# Patient Record
Sex: FEMALE | Race: BLACK OR AFRICAN AMERICAN | Employment: FULL TIME | URBAN - METROPOLITAN AREA
[De-identification: names, ages, dates, MRNs, and addresses within clinical notes are randomized per-mention and may not be internally consistent; named-entity substitution may affect disease eponyms.]

---

## 2017-10-06 ENCOUNTER — HOSPITAL ENCOUNTER (OUTPATIENT)
Dept: MAMMOGRAPHY | Age: 57
Discharge: HOME | End: 2017-10-06
Attending: FAMILY MEDICINE
Payer: COMMERCIAL

## 2017-10-06 DIAGNOSIS — Z12.31 VISIT FOR SCREENING MAMMOGRAM: ICD-10-CM

## 2017-10-06 PROCEDURE — 77063 BREAST TOMOSYNTHESIS BI: CPT

## 2018-04-20 ENCOUNTER — HOSPITAL ENCOUNTER (OUTPATIENT)
Dept: CT IMAGING | Age: 58
Discharge: HOME | End: 2018-04-20
Attending: OBSTETRICS & GYNECOLOGY
Payer: COMMERCIAL

## 2018-04-20 DIAGNOSIS — R31.21 ASYMPTOMATIC MICROSCOPIC HEMATURIA: ICD-10-CM

## 2018-04-20 PROCEDURE — 74011000258 HC RX REV CODE- 258: Performed by: OBSTETRICS & GYNECOLOGY

## 2018-04-20 PROCEDURE — 74011636320 HC RX REV CODE- 636/320: Performed by: OBSTETRICS & GYNECOLOGY

## 2018-04-20 PROCEDURE — 74178 CT ABD&PLV WO CNTR FLWD CNTR: CPT

## 2018-04-20 RX ORDER — SODIUM CHLORIDE 0.9 % (FLUSH) 0.9 %
10 SYRINGE (ML) INJECTION
Status: COMPLETED | OUTPATIENT
Start: 2018-04-20 | End: 2018-04-20

## 2018-04-20 RX ADMIN — SODIUM CHLORIDE 100 ML: 900 INJECTION, SOLUTION INTRAVENOUS at 18:43

## 2018-04-20 RX ADMIN — Medication 10 ML: at 18:43

## 2018-04-20 RX ADMIN — IOPAMIDOL 100 ML: 612 INJECTION, SOLUTION INTRAVENOUS at 18:43

## 2018-11-19 ENCOUNTER — HOSPITAL ENCOUNTER (OUTPATIENT)
Dept: MAMMOGRAPHY | Age: 58
Discharge: HOME | End: 2018-11-19
Attending: FAMILY MEDICINE
Payer: COMMERCIAL

## 2018-11-19 DIAGNOSIS — Z12.39 SCREENING BREAST EXAMINATION: ICD-10-CM

## 2018-11-19 DIAGNOSIS — Z13.820 SCREENING FOR OSTEOPOROSIS: ICD-10-CM

## 2018-11-19 PROCEDURE — 77063 BREAST TOMOSYNTHESIS BI: CPT

## 2018-11-19 PROCEDURE — 77080 DXA BONE DENSITY AXIAL: CPT

## 2019-11-21 ENCOUNTER — HOSPITAL ENCOUNTER (OUTPATIENT)
Dept: NUCLEAR MEDICINE | Age: 59
End: 2019-11-21
Attending: UROLOGY
Payer: COMMERCIAL

## 2019-11-21 ENCOUNTER — HOSPITAL ENCOUNTER (OUTPATIENT)
Dept: MAMMOGRAPHY | Age: 59
Discharge: HOME | End: 2019-11-21
Attending: FAMILY MEDICINE
Payer: COMMERCIAL

## 2019-11-21 DIAGNOSIS — Z12.31 VISIT FOR SCREENING MAMMOGRAM: ICD-10-CM

## 2019-11-21 PROCEDURE — 77063 BREAST TOMOSYNTHESIS BI: CPT

## 2020-07-08 ENCOUNTER — OFFICE VISIT (OUTPATIENT)
Dept: GYNECOLOGY | Age: 60
End: 2020-07-08

## 2020-07-08 VITALS
WEIGHT: 141.2 LBS | HEIGHT: 69 IN | SYSTOLIC BLOOD PRESSURE: 110 MMHG | BODY MASS INDEX: 20.91 KG/M2 | DIASTOLIC BLOOD PRESSURE: 69 MMHG | HEART RATE: 70 BPM

## 2020-07-08 DIAGNOSIS — D25.0 INTRAMURAL, SUBMUCOUS, AND SUBSEROUS LEIOMYOMA OF UTERUS: Primary | ICD-10-CM

## 2020-07-08 DIAGNOSIS — N85.8 UTERINE MASS: ICD-10-CM

## 2020-07-08 DIAGNOSIS — D25.2 INTRAMURAL, SUBMUCOUS, AND SUBSEROUS LEIOMYOMA OF UTERUS: Primary | ICD-10-CM

## 2020-07-08 DIAGNOSIS — D25.1 INTRAMURAL, SUBMUCOUS, AND SUBSEROUS LEIOMYOMA OF UTERUS: Primary | ICD-10-CM

## 2020-07-08 RX ORDER — CHOLECALCIFEROL (VITAMIN D3) 125 MCG
1 CAPSULE ORAL DAILY
COMMUNITY

## 2020-07-08 NOTE — PROGRESS NOTES
[address], [address], [address] [place] [address]  P [phone]  F [phone]    Office Note  Patient ID:  Name:  Albino Johns  MRN:  [id number]  :  1960/60 y.o. Date:  2020      HISTORY OF PRESENT ILLNESS:  Albino Johns is a 62 y.o.  postmenopausal female who is being seen for enlarging uterine fibroids in menopause. She is referred by Dr. Dheeraj Dumont. She has a long history of uterine fibroids. She recently underwent a pelvic ultrasound at [facility]. This revealed numerous enlarging fibroids, although not significantly larger than previously. There were at least 8 measurable fibroids. The right ovary appeared normal.  The left ovary was not seen. There was no free fluid. Due to the concerning fact of postmenopausal enlargement of fibroids, Dr. Jessica Castro has asked that I see her in consultation for surgery. ROS:   and GI review:  Negative  Cardiopulmonary review:  Negative   Musculoskeletal:  Negative    A comprehensive review of systems was negative except for that written in the History of Present Illness. , 10 point ROS      OB/GYN ROS:    Patient denies any abnormal bleeding or vaginal discharge. Problem List:  There are no active problems to display for this patient. PMH:  No past medical history on file. PSH:  No past surgical history on file. Social History:  Social History     Tobacco Use    Smoking status: Not on file   Substance Use Topics    Alcohol use: Not on file      Family History:  No family history on file. Medications: (reviewed)  No current outpatient medications on file. No current facility-administered medications for this visit. Allergies: (reviewed)  Allergies not on file       OBJECTIVE:    Physical Exam:  VITAL SIGNS: There were no vitals filed for this visit. There is no height or weight on file to calculate BMI. GENERAL AXEL: Conversant, alert, oriented.  No acute distress. HEENT: HEENT. No thyroid enlargement. No JVD. Neck: Supple without restrictions. RESPIRATORY: Clear to auscultation and percussion to the bases. No CVAT. CARDIOVASC: RRR without murmur/rub. GASTROINT: soft, non-tender, without masses or organomegaly   MUSCULOSKEL: no joint tenderness, deformity or swelling   EXTREMITIES: extremities normal, atraumatic, no cyanosis or edema   PELVIC: Vulva and vagina appear normal. Normal cervix. Bimanual exam reveals a mildly enlarged fibroid uterus, which was fairly mobile. The adnexa were not palpable. RECTAL: Deferred   LEXIE SURVEY: No suspicious lymphadenopathy or edema noted. NEURO: Grossly intact. No acute deficit. Lab Data:    No results found for: WBC, HGB, HCT, PLT, MCV, HGBEXT, HCTEXT, PLTEXT  No results found for: NA, K, CL, CO2, AGAP, GLU, BUN, CREA, BUCR, GFRAA, GFRNA, CA      Imaging: Outside pelvic ultrasound from [facility] reviewed. Pertinent findings noted in HPI. IMPRESSION/PLAN:  Isidoro Ron is a 62 y.o. female with a working diagnosis of enlarging uterine fibroids in menopause. I reviewed with Isidoro Ron her medical records, physical exam, and review of symptoms. I explained to her Dr. Ismael Silveira and my concern of the enlarging fibroids. Typically fibroids are going to shrink in menopause. Postmenopausal enlargement raises the possibility of uterine sarcoma. I recommended that we proceed with a laparoscopic hysterectomy with bilateral salpingooophorectomy. We will send the uterus for frozen section pathology to determine the need for additional staging or evaluation. She was counseled on the risks, benefits, indications, and alternatives of surgery. Her questions were answered and she wishes to proceed as planned. The patient was counseled at length about the risks of dana Covid-19 during their perioperative period and any recovery window from their procedure.   The patient was made aware that dana Covid-19  may worsen their prognosis for recovering from their procedure and lend to a higher morbidity and/or mortality risk. All material risks, benefits, and reasonable alternatives including postponing the procedure were discussed. The patient does wish to proceed with the procedure at this time.       Signed By: Irma Mejía MD     7/8/2020/9:00 AM

## 2020-07-08 NOTE — LETTER
2020 4:24 PM 
 
Patient:  Chip Caro YOB: 1960 Date of Visit: 2020 Dear Megan Mathis MD 
[id number] [phone] [id number] VIA In Basket: Thank you for referring Ms. Chip Caro to me for evaluation/treatment. Below are the relevant portions of my assessment and plan of care. [facility], [address] 
P [phone]  F [phone] Office Note Patient ID: 
Name:  Chip Caro MRN:  [id number] :  1960/60 y.o. Date:  2020 HISTORY OF PRESENT ILLNESS: 
Chip Caro is a 62 y.o.  postmenopausal female who is being seen for enlarging uterine fibroids in menopause. She is referred by Dr. Blessing Whitney. She has a long history of uterine fibroids. She recently underwent a pelvic ultrasound at [facility]. This revealed numerous enlarging fibroids, although not significantly larger than previously. There were at least 8 measurable fibroids. The right ovary appeared normal.  The left ovary was not seen. There was no free fluid. Due to the concerning fact of postmenopausal enlargement of fibroids, Dr. Nimisha Clarke has asked that I see her in consultation for surgery. ROS: 
 and GI review:  Negative Cardiopulmonary review:  Negative Musculoskeletal:  Negative A comprehensive review of systems was negative except for that written in the History of Present Illness. , 10 point ROS 
 
 
OB/GYN ROS: 
 Patient denies any abnormal bleeding or vaginal discharge. Problem List: 
There are no active problems to display for this patient. PMH: 
No past medical history on file. PSH: 
No past surgical history on file. Social History: 
Social History Tobacco Use  Smoking status: Not on file Substance Use Topics  Alcohol use: Not on file Family History: No family history on file. Medications: (reviewed) No current outpatient medications on file. No current facility-administered medications for this visit. Allergies: (reviewed) Allergies not on file OBJECTIVE: 
 
Physical Exam: VITAL SIGNS: There were no vitals filed for this visit. There is no height or weight on file to calculate BMI. GENERAL AXEL: Conversant, alert, oriented. No acute distress. HEENT: HEENT. No thyroid enlargement. No JVD. Neck: Supple without restrictions. RESPIRATORY: Clear to auscultation and percussion to the bases. No CVAT. CARDIOVASC: RRR without murmur/rub. GASTROINT: soft, non-tender, without masses or organomegaly MUSCULOSKEL: no joint tenderness, deformity or swelling EXTREMITIES: extremities normal, atraumatic, no cyanosis or edema PELVIC: Vulva and vagina appear normal. Normal cervix. Bimanual exam reveals a mildly enlarged fibroid uterus, which was fairly mobile. The adnexa were not palpable. RECTAL: Deferred LEXIE SURVEY: No suspicious lymphadenopathy or edema noted. NEURO: Grossly intact. No acute deficit. Lab Data: 
 
No results found for: WBC, HGB, HCT, PLT, MCV, HGBEXT, HCTEXT, PLTEXT No results found for: NA, K, CL, CO2, AGAP, GLU, BUN, CREA, BUCR, GFRAA, GFRNA, CA Imaging: Outside pelvic ultrasound from [facility] reviewed. Pertinent findings noted in HPI. IMPRESSION/PLAN: 
Sary Rivas is a 62 y.o. female with a working diagnosis of enlarging uterine fibroids in menopause. I reviewed with Sary Rivas her medical records, physical exam, and review of symptoms. I explained to her Dr. Bryan Viera and my concern of the enlarging fibroids. Typically fibroids are going to shrink in menopause. Postmenopausal enlargement raises the possibility of uterine sarcoma. I recommended that we proceed with a laparoscopic hysterectomy with bilateral salpingooophorectomy.   We will send the uterus for frozen section pathology to determine the need for additional staging or evaluation. She was counseled on the risks, benefits, indications, and alternatives of surgery. Her questions were answered and she wishes to proceed as planned. The patient was counseled at length about the risks of dana Covid-19 during their perioperative period and any recovery window from their procedure. The patient was made aware that dana Covid-19  may worsen their prognosis for recovering from their procedure and lend to a higher morbidity and/or mortality risk. All material risks, benefits, and reasonable alternatives including postponing the procedure were discussed. The patient  does wish to proceed with the procedure at this time. Signed By: Elo Busch MD   
 7/8/2020/9:00 AM  
 
 
New patient referred by [facility] for fibroids. Patient c/o minor discomfort in her lower abdomen. If you have questions, please do not hesitate to call me. I look forward to following MsTala Roberts along with you.  
 
 
 
Sincerely, 
 
 
Elo Busch MD

## 2020-07-08 NOTE — PROGRESS NOTES
New patient referred by [facility] for fibroids. Patient c/o minor discomfort in her lower abdomen.

## 2020-07-08 NOTE — PATIENT INSTRUCTIONS
[employer] Activation Thank you for requesting access to [employer]. Please follow the instructions below to securely access and download your online medical record. [employer] allows you to send messages to your doctor, view your test results, renew your prescriptions, schedule appointments, and more. How Do I Sign Up? 1. In your internet browser, go to [email or web address]. 2. Click on the First Time User? Click Here link in the Sign In box. You will see the New Member Sign Up page. 3. Enter your [employer] Access Code exactly as it appears below. You will not need to use this code after youve completed the sign-up process. If you do not sign up before the expiration date, you must request a new code. [employer] Access Code: [email or web address] Expires: 2020  2:53 PM (This is the date your [employer] access code will ) 4. Enter the last four digits of your Social Security Number (xxxx) and Date of Birth (mm/dd/yyyy) as indicated and click Submit. You will be taken to the next sign-up page. 5. Create a [employer] ID. This will be your [employer] login ID and cannot be changed, so think of one that is secure and easy to remember. 6. Create a [employer] password. You can change your password at any time. 7. Enter your Password Reset Question and Answer. This can be used at a later time if you forget your password. 8. Enter your e-mail address. You will receive e-mail notification when new information is available in [address] 9. Click Sign Up. You can now view and download portions of your medical record. 10. Click the Download Summary menu link to download a portable copy of your medical information. Additional Information If you have questions, please visit the Frequently Asked Questions section of the [employer] website at [email or web address] Remember, [employer] is NOT to be used for urgent needs. For medical emergencies, dial 911.

## 2020-07-28 ENCOUNTER — HOSPITAL ENCOUNTER (OUTPATIENT)
Dept: PREADMISSION TESTING | Age: 60
Discharge: HOME | End: 2020-07-28
Payer: COMMERCIAL

## 2020-07-28 VITALS
WEIGHT: 139.99 LBS | SYSTOLIC BLOOD PRESSURE: 112 MMHG | DIASTOLIC BLOOD PRESSURE: 70 MMHG | HEIGHT: 70 IN | BODY MASS INDEX: 20.04 KG/M2

## 2020-07-28 LAB
ALBUMIN SERPL-MCNC: 3.7 G/DL (ref 3.5–5)
ALBUMIN/GLOB SERPL: 1.1 {RATIO} (ref 1.1–2.2)
ALP SERPL-CCNC: 92 U/L (ref 45–117)
ALT SERPL-CCNC: 18 U/L (ref 12–78)
ANION GAP SERPL CALC-SCNC: 5 MMOL/L (ref 5–15)
AST SERPL-CCNC: 13 U/L (ref 15–37)
BASOPHILS # BLD: 0 K/UL (ref 0–0.1)
BASOPHILS NFR BLD: 1 % (ref 0–1)
BILIRUB SERPL-MCNC: 0.5 MG/DL (ref 0.2–1)
BUN SERPL-MCNC: 17 MG/DL (ref 6–20)
BUN/CREAT SERPL: 23 (ref 12–20)
CALCIUM SERPL-MCNC: 9 MG/DL (ref 8.5–10.1)
CHLORIDE SERPL-SCNC: 108 MMOL/L (ref 97–108)
CO2 SERPL-SCNC: 29 MMOL/L (ref 21–32)
CREAT SERPL-MCNC: 0.73 MG/DL (ref 0.55–1.02)
DIFFERENTIAL METHOD BLD: ABNORMAL
EOSINOPHIL # BLD: 0.1 K/UL (ref 0–0.4)
EOSINOPHIL NFR BLD: 3 % (ref 0–7)
ERYTHROCYTE [DISTWIDTH] IN BLOOD BY AUTOMATED COUNT: 12.3 % (ref 11.5–14.5)
GLOBULIN SER CALC-MCNC: 3.4 G/DL (ref 2–4)
GLUCOSE SERPL-MCNC: 80 MG/DL (ref 65–100)
HCT VFR BLD AUTO: 37.3 % (ref 35–47)
HGB BLD-MCNC: 11.7 G/DL (ref 11.5–16)
IMM GRANULOCYTES # BLD AUTO: 0 K/UL (ref 0–0.04)
IMM GRANULOCYTES NFR BLD AUTO: 0 % (ref 0–0.5)
LYMPHOCYTES # BLD: 0.8 K/UL (ref 0.8–3.5)
LYMPHOCYTES NFR BLD: 26 % (ref 12–49)
MCH RBC QN AUTO: 29.5 PG (ref 26–34)
MCHC RBC AUTO-ENTMCNC: 31.4 G/DL (ref 30–36.5)
MCV RBC AUTO: 94 FL (ref 80–99)
MONOCYTES # BLD: 0.3 K/UL (ref 0–1)
MONOCYTES NFR BLD: 11 % (ref 5–13)
NEUTS SEG # BLD: 1.9 K/UL (ref 1.8–8)
NEUTS SEG NFR BLD: 59 % (ref 32–75)
NRBC # BLD: 0 K/UL (ref 0–0.01)
NRBC BLD-RTO: 0 PER 100 WBC
PLATELET # BLD AUTO: 195 K/UL (ref 150–400)
PMV BLD AUTO: 10.9 FL (ref 8.9–12.9)
POTASSIUM SERPL-SCNC: 4 MMOL/L (ref 3.5–5.1)
PROT SERPL-MCNC: 7.1 G/DL (ref 6.4–8.2)
RBC # BLD AUTO: 3.97 M/UL (ref 3.8–5.2)
RBC MORPH BLD: ABNORMAL
SODIUM SERPL-SCNC: 142 MMOL/L (ref 136–145)
WBC # BLD AUTO: 3.1 K/UL (ref 3.6–11)

## 2020-07-28 PROCEDURE — 85025 COMPLETE CBC W/AUTO DIFF WBC: CPT

## 2020-07-28 PROCEDURE — 80053 COMPREHEN METABOLIC PANEL: CPT

## 2020-07-28 PROCEDURE — 36415 COLL VENOUS BLD VENIPUNCTURE: CPT

## 2020-07-28 NOTE — PERIOP NOTES
PATIENT GIVEN SURGICAL SITE INFECTION FAQ HANDOUT AND HAND WASHING TIP SHEET. PREOP INSTRUCTIONS REVIEWED AND PATIENT VERBALIZES UNDERSTANDING OF INSTRUCTIONS. PATIENT HAS BEEN GIVEN THE OPPORTUNITY TO ASK ADDITIONAL QUESTIONS. GAVE PATIENT 2 BOTTLES OF CHG CLEANSER AND INSTRUCTIONS, PATIENT VERBALIZED UNDERSTANDING. PATIENT CALLED AND MADE AWARE OF COVID-19 TESTING NEEDED TO BE DONE WITHIN 96 HOURS OF SURGERY. COVID-19 TESTING APPOINTMENT TO BE  MADE FOR PATIENT. PATIENT INSTRUCTED ON SELF QUARANTINE BETWEEN TESTING AND ARRIVAL TIME DAY OF SURGERY.

## 2020-08-04 ENCOUNTER — TELEPHONE (OUTPATIENT)
Dept: GYNECOLOGY | Age: 60
End: 2020-08-04

## 2020-08-08 ENCOUNTER — HOSPITAL ENCOUNTER (OUTPATIENT)
Dept: PREADMISSION TESTING | Age: 60
Discharge: HOME | End: 2020-08-08
Payer: COMMERCIAL

## 2020-08-08 DIAGNOSIS — Z20.822 ENCOUNTER FOR LABORATORY TESTING FOR COVID-19 VIRUS: ICD-10-CM

## 2020-08-08 PROCEDURE — 87635 SARS-COV-2 COVID-19 AMP PRB: CPT

## 2020-08-09 LAB — SARS-COV-2, COV2NT: NOT DETECTED

## 2020-08-11 NOTE — H&P
[facility] [address], [address] [facility]  P [phone]  F [phone]    Office Note  Patient ID:  Name:  Katerina Mccoy  MRN:  [id number]  :  1960/60 y.o. Date:  2020      HISTORY OF PRESENT ILLNESS:  Katerina Mccoy is a 62 y.o.  postmenopausal female who is being seen for enlarging uterine fibroids in menopause. She is referred by Dr. Adarsh Mejia. She has a long history of uterine fibroids. She recently underwent a pelvic ultrasound at [facility]. This revealed numerous enlarging fibroids, although not significantly larger than previously. There were at least 8 measurable fibroids. The right ovary appeared normal.  The left ovary was not seen. There was no free fluid. Due to the concerning fact of postmenopausal enlargement of fibroids, Dr. Jeffery Trinidad has asked that I see her in consultation for surgery. ROS:   and GI review:  Negative  Cardiopulmonary review:  Negative   Musculoskeletal:  Negative    A comprehensive review of systems was negative except for that written in the History of Present Illness. , 10 point ROS      OB/GYN ROS:    Patient denies any abnormal bleeding or vaginal discharge. Problem List:  There are no active problems to display for this patient. PMH:  Past Medical History:   Diagnosis Date    Colon polyps     Fibrocystic breast     Fibroids     Kidney stones     Motion sickness       PSH:  Past Surgical History:   Procedure Laterality Date    HX BREAST BIOPSY Left     years ago, benign, no scar    HX BREAST BIOPSY      Fragments of benign breast tissue with focal scant microcalcifications.  Focal reactive lymphohistiocytic infiltrate    HX CERVICAL POLYPECTOMY      HX COLONOSCOPY      benign polyp    HX HYSTEROSCOPY      resection of endometrial polyp    HX OTHER SURGICAL  , 2008    Endometrial biopsy- polyp noted    HX UROLOGICAL      scope removal of stone     SHANE BIOPSY BREAST STEREOTACTIC Left 02/2014    benign      Social History:  Social History     Tobacco Use    Smoking status: Never Smoker    Smokeless tobacco: Never Used   Substance Use Topics    Alcohol use: Never     Frequency: Never      Family History:  Family History   Problem Relation Age of Onset    Lung Cancer Father     Breast Cancer Maternal Aunt         60's    Anesth Problems Neg Hx       Medications: (reviewed)  No current facility-administered medications for this encounter. Current Outpatient Medications   Medication Sig    cholecalciferol, vitamin D3, (Vitamin D3) 50 mcg (2,000 unit) tab Take 1 Tab by mouth daily. Allergies: (reviewed)  Allergies   Allergen Reactions    Adhesive Tape-Silicones Rash    Pcn [Penicillins] Nausea Only          OBJECTIVE:    Physical Exam:  VITAL SIGNS: There were no vitals filed for this visit. There is no height or weight on file to calculate BMI. GENERAL AXEL: Conversant, alert, oriented. No acute distress. HEENT: HEENT. No thyroid enlargement. No JVD. Neck: Supple without restrictions. RESPIRATORY: Clear to auscultation and percussion to the bases. No CVAT. CARDIOVASC: RRR without murmur/rub. GASTROINT: soft, non-tender, without masses or organomegaly   MUSCULOSKEL: no joint tenderness, deformity or swelling   EXTREMITIES: extremities normal, atraumatic, no cyanosis or edema   PELVIC: Vulva and vagina appear normal. Normal cervix. Bimanual exam reveals a mildly enlarged fibroid uterus, which was fairly mobile. The adnexa were not palpable. RECTAL: Deferred   LEXIE SURVEY: No suspicious lymphadenopathy or edema noted. NEURO: Grossly intact. No acute deficit.        Lab Data:    Lab Results   Component Value Date/Time    WBC 3.1 (L) 07/28/2020 08:34 AM    HGB 11.7 07/28/2020 08:34 AM    HCT 37.3 07/28/2020 08:34 AM    PLATELET 936 99/83/1768 08:34 AM    MCV 94.0 07/28/2020 08:34 AM     Lab Results   Component Value Date/Time    Sodium 142 07/28/2020 08:34 AM    Potassium 4.0 07/28/2020 08:34 AM    Chloride 108 07/28/2020 08:34 AM    CO2 29 07/28/2020 08:34 AM    Anion gap 5 07/28/2020 08:34 AM    Glucose 80 07/28/2020 08:34 AM    BUN 17 07/28/2020 08:34 AM    Creatinine 0.73 07/28/2020 08:34 AM    BUN/Creatinine ratio 23 (H) 07/28/2020 08:34 AM    GFR est AA >60 07/28/2020 08:34 AM    GFR est non-AA >60 07/28/2020 08:34 AM    Calcium 9.0 07/28/2020 08:34 AM         Imaging: Outside pelvic ultrasound from [facility] reviewed. Pertinent findings noted in HPI. IMPRESSION/PLAN:  Jose L Ann is a 62 y.o. female with a working diagnosis of enlarging uterine fibroids in menopause. I reviewed with Jose L Ann her medical records, physical exam, and review of symptoms. I explained to her Dr. Wolf Jack and my concern of the enlarging fibroids. Typically fibroids are going to shrink in menopause. Postmenopausal enlargement raises the possibility of uterine sarcoma. I recommended that we proceed with a laparoscopic hysterectomy with bilateral salpingooophorectomy. We will send the uterus for frozen section pathology to determine the need for additional staging or evaluation. She was counseled on the risks, benefits, indications, and alternatives of surgery. Her questions were answered and she wishes to proceed as planned. The patient was counseled at length about the risks of dana Covid-19 during their perioperative period and any recovery window from their procedure. The patient was made aware that dana Covid-19  may worsen their prognosis for recovering from their procedure and lend to a higher morbidity and/or mortality risk. All material risks, benefits, and reasonable alternatives including postponing the procedure were discussed. The patient does wish to proceed with the procedure at this time.       Signed By: Ella Gresham MD     8/11/2020/9:00 AM       Date of Surgery Update:  Melissa Vazquez was seen and examined. History and physical has been reviewed. The patient has been examined. There have been no significant clinical changes since the completion of the originally dated History and Physical.  Patient identified by surgeon; surgical site was confirmed by patient and surgeon.     Signed By: Irma Mejía MD     August 12, 2020 7:35 AM

## 2020-08-12 ENCOUNTER — HOSPITAL ENCOUNTER (OUTPATIENT)
Age: 60
Discharge: HOME | End: 2020-08-12
Attending: OBSTETRICS & GYNECOLOGY | Admitting: OBSTETRICS & GYNECOLOGY
Payer: COMMERCIAL

## 2020-08-12 ENCOUNTER — ANESTHESIA (OUTPATIENT)
Dept: SURGERY | Age: 60
End: 2020-08-12
Payer: COMMERCIAL

## 2020-08-12 ENCOUNTER — ANESTHESIA EVENT (OUTPATIENT)
Dept: SURGERY | Age: 60
End: 2020-08-12
Payer: COMMERCIAL

## 2020-08-12 VITALS
HEART RATE: 89 BPM | RESPIRATION RATE: 16 BRPM | BODY MASS INDEX: 19.92 KG/M2 | WEIGHT: 139.15 LBS | DIASTOLIC BLOOD PRESSURE: 73 MMHG | TEMPERATURE: 99.1 F | HEIGHT: 70 IN | SYSTOLIC BLOOD PRESSURE: 127 MMHG | OXYGEN SATURATION: 98 %

## 2020-08-12 DIAGNOSIS — Z90.710 S/P HYSTERECTOMY: Primary | ICD-10-CM

## 2020-08-12 DIAGNOSIS — D25.1 INTRAMURAL AND SUBSEROUS LEIOMYOMA OF UTERUS: ICD-10-CM

## 2020-08-12 DIAGNOSIS — D25.2 INTRAMURAL AND SUBSEROUS LEIOMYOMA OF UTERUS: ICD-10-CM

## 2020-08-12 PROBLEM — D25.9 LEIOMYOMA OF UTERUS: Status: ACTIVE | Noted: 2020-08-12

## 2020-08-12 PROCEDURE — 77030010507 HC ADH SKN DERMBND J&J -B: Performed by: OBSTETRICS & GYNECOLOGY

## 2020-08-12 PROCEDURE — 77030002882 HC SUT CART KNOT LSIS -B: Performed by: OBSTETRICS & GYNECOLOGY

## 2020-08-12 PROCEDURE — 88311 DECALCIFY TISSUE: CPT

## 2020-08-12 PROCEDURE — 77030033639 HC SHR ENDO COAG HARM 36 J&J -E: Performed by: OBSTETRICS & GYNECOLOGY

## 2020-08-12 PROCEDURE — 74011250636 HC RX REV CODE- 250/636: Performed by: NURSE ANESTHETIST, CERTIFIED REGISTERED

## 2020-08-12 PROCEDURE — 77030002933 HC SUT MCRYL J&J -A: Performed by: OBSTETRICS & GYNECOLOGY

## 2020-08-12 PROCEDURE — 77030002968 HC SUT PDS LSIS -B: Performed by: OBSTETRICS & GYNECOLOGY

## 2020-08-12 PROCEDURE — 77010033678 HC OXYGEN DAILY

## 2020-08-12 PROCEDURE — 77030008756 HC TU IRR SUC STRY -B: Performed by: OBSTETRICS & GYNECOLOGY

## 2020-08-12 PROCEDURE — 88307 TISSUE EXAM BY PATHOLOGIST: CPT

## 2020-08-12 PROCEDURE — 76060000035 HC ANESTHESIA 2 TO 2.5 HR: Performed by: OBSTETRICS & GYNECOLOGY

## 2020-08-12 PROCEDURE — 77030008684 HC TU ET CUF COVD -B: Performed by: ANESTHESIOLOGY

## 2020-08-12 PROCEDURE — 88331 PATH CONSLTJ SURG 1 BLK 1SPC: CPT

## 2020-08-12 PROCEDURE — 74011000250 HC RX REV CODE- 250: Performed by: NURSE ANESTHETIST, CERTIFIED REGISTERED

## 2020-08-12 PROCEDURE — 77030040830 HC CATH URETH FOL MDII -A: Performed by: OBSTETRICS & GYNECOLOGY

## 2020-08-12 PROCEDURE — 74011250636 HC RX REV CODE- 250/636: Performed by: ANESTHESIOLOGY

## 2020-08-12 PROCEDURE — 74011000258 HC RX REV CODE- 258: Performed by: OBSTETRICS & GYNECOLOGY

## 2020-08-12 PROCEDURE — 74011250637 HC RX REV CODE- 250/637: Performed by: NURSE ANESTHETIST, CERTIFIED REGISTERED

## 2020-08-12 PROCEDURE — 74011000250 HC RX REV CODE- 250: Performed by: OBSTETRICS & GYNECOLOGY

## 2020-08-12 PROCEDURE — 99218 HC RM OBSERVATION: CPT

## 2020-08-12 PROCEDURE — 77030002904 HC SUT DEV RNNG LSIS -C: Performed by: OBSTETRICS & GYNECOLOGY

## 2020-08-12 PROCEDURE — 74011250637 HC RX REV CODE- 250/637: Performed by: ANESTHESIOLOGY

## 2020-08-12 PROCEDURE — 77030020829: Performed by: OBSTETRICS & GYNECOLOGY

## 2020-08-12 PROCEDURE — 74011250636 HC RX REV CODE- 250/636: Performed by: OBSTETRICS & GYNECOLOGY

## 2020-08-12 PROCEDURE — 77030040361 HC SLV COMPR DVT MDII -B: Performed by: OBSTETRICS & GYNECOLOGY

## 2020-08-12 PROCEDURE — 88112 CYTOPATH CELL ENHANCE TECH: CPT

## 2020-08-12 PROCEDURE — 76210000006 HC OR PH I REC 0.5 TO 1 HR: Performed by: OBSTETRICS & GYNECOLOGY

## 2020-08-12 PROCEDURE — 76010000131 HC OR TIME 2 TO 2.5 HR: Performed by: OBSTETRICS & GYNECOLOGY

## 2020-08-12 PROCEDURE — 74011250637 HC RX REV CODE- 250/637: Performed by: OBSTETRICS & GYNECOLOGY

## 2020-08-12 PROCEDURE — 77030040922 HC BLNKT HYPOTHRM STRY -A

## 2020-08-12 PROCEDURE — 77030002903 HC SUT DEV PLCMNT LSIS -C: Performed by: OBSTETRICS & GYNECOLOGY

## 2020-08-12 PROCEDURE — 77030012770 HC TRCR OPT FX AMR -B: Performed by: OBSTETRICS & GYNECOLOGY

## 2020-08-12 PROCEDURE — 58571 TLH W/T/O 250 G OR LESS: CPT | Performed by: OBSTETRICS & GYNECOLOGY

## 2020-08-12 PROCEDURE — 77030011640 HC PAD GRND REM COVD -A: Performed by: OBSTETRICS & GYNECOLOGY

## 2020-08-12 PROCEDURE — 77030020263 HC SOL INJ SOD CL0.9% LFCR 1000ML: Performed by: OBSTETRICS & GYNECOLOGY

## 2020-08-12 RX ORDER — ROCURONIUM BROMIDE 10 MG/ML
INJECTION, SOLUTION INTRAVENOUS AS NEEDED
Status: DISCONTINUED | OUTPATIENT
Start: 2020-08-12 | End: 2020-08-12 | Stop reason: HOSPADM

## 2020-08-12 RX ORDER — DIPHENHYDRAMINE HYDROCHLORIDE 50 MG/ML
12.5 INJECTION, SOLUTION INTRAMUSCULAR; INTRAVENOUS AS NEEDED
Status: DISCONTINUED | OUTPATIENT
Start: 2020-08-12 | End: 2020-08-12 | Stop reason: HOSPADM

## 2020-08-12 RX ORDER — SUCCINYLCHOLINE CHLORIDE 20 MG/ML
INJECTION INTRAMUSCULAR; INTRAVENOUS AS NEEDED
Status: DISCONTINUED | OUTPATIENT
Start: 2020-08-12 | End: 2020-08-12 | Stop reason: HOSPADM

## 2020-08-12 RX ORDER — SODIUM CHLORIDE 0.9 % (FLUSH) 0.9 %
5-40 SYRINGE (ML) INJECTION AS NEEDED
Status: DISCONTINUED | OUTPATIENT
Start: 2020-08-12 | End: 2020-08-12 | Stop reason: HOSPADM

## 2020-08-12 RX ORDER — KETOROLAC TROMETHAMINE 30 MG/ML
30 INJECTION, SOLUTION INTRAMUSCULAR; INTRAVENOUS EVERY 6 HOURS
Status: DISCONTINUED | OUTPATIENT
Start: 2020-08-12 | End: 2020-08-12 | Stop reason: HOSPADM

## 2020-08-12 RX ORDER — SODIUM CHLORIDE 9 MG/ML
125 INJECTION, SOLUTION INTRAVENOUS CONTINUOUS
Status: DISCONTINUED | OUTPATIENT
Start: 2020-08-12 | End: 2020-08-12 | Stop reason: HOSPADM

## 2020-08-12 RX ORDER — SODIUM CHLORIDE, SODIUM LACTATE, POTASSIUM CHLORIDE, CALCIUM CHLORIDE 600; 310; 30; 20 MG/100ML; MG/100ML; MG/100ML; MG/100ML
INJECTION, SOLUTION INTRAVENOUS
Status: DISCONTINUED | OUTPATIENT
Start: 2020-08-12 | End: 2020-08-12 | Stop reason: HOSPADM

## 2020-08-12 RX ORDER — FENTANYL CITRATE 50 UG/ML
25 INJECTION, SOLUTION INTRAMUSCULAR; INTRAVENOUS
Status: DISCONTINUED | OUTPATIENT
Start: 2020-08-12 | End: 2020-08-12 | Stop reason: HOSPADM

## 2020-08-12 RX ORDER — FENTANYL CITRATE 50 UG/ML
50 INJECTION, SOLUTION INTRAMUSCULAR; INTRAVENOUS AS NEEDED
Status: DISCONTINUED | OUTPATIENT
Start: 2020-08-12 | End: 2020-08-12 | Stop reason: HOSPADM

## 2020-08-12 RX ORDER — SODIUM CHLORIDE, SODIUM LACTATE, POTASSIUM CHLORIDE, CALCIUM CHLORIDE 600; 310; 30; 20 MG/100ML; MG/100ML; MG/100ML; MG/100ML
100 INJECTION, SOLUTION INTRAVENOUS CONTINUOUS
Status: DISCONTINUED | OUTPATIENT
Start: 2020-08-12 | End: 2020-08-12 | Stop reason: HOSPADM

## 2020-08-12 RX ORDER — DEXAMETHASONE SODIUM PHOSPHATE 4 MG/ML
INJECTION, SOLUTION INTRA-ARTICULAR; INTRALESIONAL; INTRAMUSCULAR; INTRAVENOUS; SOFT TISSUE AS NEEDED
Status: DISCONTINUED | OUTPATIENT
Start: 2020-08-12 | End: 2020-08-12 | Stop reason: HOSPADM

## 2020-08-12 RX ORDER — DIPHENHYDRAMINE HYDROCHLORIDE 50 MG/ML
12.5 INJECTION, SOLUTION INTRAMUSCULAR; INTRAVENOUS
Status: DISCONTINUED | OUTPATIENT
Start: 2020-08-12 | End: 2020-08-12 | Stop reason: HOSPADM

## 2020-08-12 RX ORDER — SODIUM CHLORIDE 0.9 % (FLUSH) 0.9 %
5-40 SYRINGE (ML) INJECTION EVERY 8 HOURS
Status: DISCONTINUED | OUTPATIENT
Start: 2020-08-12 | End: 2020-08-12 | Stop reason: HOSPADM

## 2020-08-12 RX ORDER — BUPIVACAINE HYDROCHLORIDE 5 MG/ML
INJECTION, SOLUTION EPIDURAL; INTRACAUDAL AS NEEDED
Status: DISCONTINUED | OUTPATIENT
Start: 2020-08-12 | End: 2020-08-12 | Stop reason: HOSPADM

## 2020-08-12 RX ORDER — TRAMADOL HYDROCHLORIDE 50 MG/1
50 TABLET ORAL
Qty: 10 TAB | Refills: 0 | Status: SHIPPED | OUTPATIENT
Start: 2020-08-12 | End: 2020-08-15

## 2020-08-12 RX ORDER — HYDROMORPHONE HYDROCHLORIDE 1 MG/ML
0.5 INJECTION, SOLUTION INTRAMUSCULAR; INTRAVENOUS; SUBCUTANEOUS
Status: DISCONTINUED | OUTPATIENT
Start: 2020-08-12 | End: 2020-08-12 | Stop reason: HOSPADM

## 2020-08-12 RX ORDER — SCOLOPAMINE TRANSDERMAL SYSTEM 1 MG/1
PATCH, EXTENDED RELEASE TRANSDERMAL AS NEEDED
Status: DISCONTINUED | OUTPATIENT
Start: 2020-08-12 | End: 2020-08-12 | Stop reason: HOSPADM

## 2020-08-12 RX ORDER — ACETAMINOPHEN 325 MG/1
650 TABLET ORAL EVERY 6 HOURS
Status: DISCONTINUED | OUTPATIENT
Start: 2020-08-12 | End: 2020-08-12 | Stop reason: HOSPADM

## 2020-08-12 RX ORDER — GLYCOPYRROLATE 0.2 MG/ML
INJECTION INTRAMUSCULAR; INTRAVENOUS AS NEEDED
Status: DISCONTINUED | OUTPATIENT
Start: 2020-08-12 | End: 2020-08-12 | Stop reason: HOSPADM

## 2020-08-12 RX ORDER — CEFAZOLIN SODIUM/WATER 2 G/20 ML
2 SYRINGE (ML) INTRAVENOUS EVERY 8 HOURS
Status: DISCONTINUED | OUTPATIENT
Start: 2020-08-12 | End: 2020-08-12 | Stop reason: HOSPADM

## 2020-08-12 RX ORDER — ACETAMINOPHEN 325 MG/1
650 TABLET ORAL ONCE
Status: COMPLETED | OUTPATIENT
Start: 2020-08-12 | End: 2020-08-12

## 2020-08-12 RX ORDER — FENTANYL CITRATE 50 UG/ML
INJECTION, SOLUTION INTRAMUSCULAR; INTRAVENOUS AS NEEDED
Status: DISCONTINUED | OUTPATIENT
Start: 2020-08-12 | End: 2020-08-12 | Stop reason: HOSPADM

## 2020-08-12 RX ORDER — MIDAZOLAM HYDROCHLORIDE 1 MG/ML
1 INJECTION, SOLUTION INTRAMUSCULAR; INTRAVENOUS AS NEEDED
Status: DISCONTINUED | OUTPATIENT
Start: 2020-08-12 | End: 2020-08-12 | Stop reason: HOSPADM

## 2020-08-12 RX ORDER — ONDANSETRON 2 MG/ML
INJECTION INTRAMUSCULAR; INTRAVENOUS AS NEEDED
Status: DISCONTINUED | OUTPATIENT
Start: 2020-08-12 | End: 2020-08-12 | Stop reason: HOSPADM

## 2020-08-12 RX ORDER — ROPIVACAINE HYDROCHLORIDE 5 MG/ML
30 INJECTION, SOLUTION EPIDURAL; INFILTRATION; PERINEURAL AS NEEDED
Status: DISCONTINUED | OUTPATIENT
Start: 2020-08-12 | End: 2020-08-12 | Stop reason: HOSPADM

## 2020-08-12 RX ORDER — MIDAZOLAM HYDROCHLORIDE 1 MG/ML
INJECTION, SOLUTION INTRAMUSCULAR; INTRAVENOUS AS NEEDED
Status: DISCONTINUED | OUTPATIENT
Start: 2020-08-12 | End: 2020-08-12 | Stop reason: HOSPADM

## 2020-08-12 RX ORDER — DOCUSATE SODIUM 100 MG/1
100 CAPSULE, LIQUID FILLED ORAL
Qty: 60 CAP | Refills: 0 | Status: SHIPPED | OUTPATIENT
Start: 2020-08-12 | End: 2020-09-11

## 2020-08-12 RX ORDER — IBUPROFEN 600 MG/1
600 TABLET ORAL
Qty: 40 TAB | Refills: 0 | Status: SHIPPED
Start: 2020-08-12

## 2020-08-12 RX ORDER — MORPHINE SULFATE 10 MG/ML
2 INJECTION, SOLUTION INTRAMUSCULAR; INTRAVENOUS
Status: DISCONTINUED | OUTPATIENT
Start: 2020-08-12 | End: 2020-08-12 | Stop reason: HOSPADM

## 2020-08-12 RX ORDER — LIDOCAINE HYDROCHLORIDE 10 MG/ML
0.1 INJECTION, SOLUTION EPIDURAL; INFILTRATION; INTRACAUDAL; PERINEURAL AS NEEDED
Status: DISCONTINUED | OUTPATIENT
Start: 2020-08-12 | End: 2020-08-12 | Stop reason: HOSPADM

## 2020-08-12 RX ORDER — NEOSTIGMINE METHYLSULFATE 1 MG/ML
INJECTION INTRAVENOUS AS NEEDED
Status: DISCONTINUED | OUTPATIENT
Start: 2020-08-12 | End: 2020-08-12 | Stop reason: HOSPADM

## 2020-08-12 RX ORDER — TRAMADOL HYDROCHLORIDE 50 MG/1
50 TABLET ORAL
Status: DISCONTINUED | OUTPATIENT
Start: 2020-08-12 | End: 2020-08-12 | Stop reason: HOSPADM

## 2020-08-12 RX ORDER — ONDANSETRON 2 MG/ML
4 INJECTION INTRAMUSCULAR; INTRAVENOUS AS NEEDED
Status: DISCONTINUED | OUTPATIENT
Start: 2020-08-12 | End: 2020-08-12 | Stop reason: HOSPADM

## 2020-08-12 RX ORDER — HYDROMORPHONE HYDROCHLORIDE 1 MG/ML
1 INJECTION, SOLUTION INTRAMUSCULAR; INTRAVENOUS; SUBCUTANEOUS
Status: DISCONTINUED | OUTPATIENT
Start: 2020-08-12 | End: 2020-08-12 | Stop reason: HOSPADM

## 2020-08-12 RX ORDER — SODIUM CHLORIDE 9 MG/ML
25 INJECTION, SOLUTION INTRAVENOUS CONTINUOUS
Status: DISCONTINUED | OUTPATIENT
Start: 2020-08-12 | End: 2020-08-12 | Stop reason: HOSPADM

## 2020-08-12 RX ORDER — LIDOCAINE HYDROCHLORIDE 20 MG/ML
INJECTION, SOLUTION EPIDURAL; INFILTRATION; INTRACAUDAL; PERINEURAL AS NEEDED
Status: DISCONTINUED | OUTPATIENT
Start: 2020-08-12 | End: 2020-08-12 | Stop reason: HOSPADM

## 2020-08-12 RX ORDER — PROPOFOL 10 MG/ML
INJECTION, EMULSION INTRAVENOUS AS NEEDED
Status: DISCONTINUED | OUTPATIENT
Start: 2020-08-12 | End: 2020-08-12 | Stop reason: HOSPADM

## 2020-08-12 RX ORDER — ACETAMINOPHEN 325 MG/1
650 TABLET ORAL EVERY 6 HOURS
Qty: 40 TAB | Refills: 0 | Status: SHIPPED
Start: 2020-08-12

## 2020-08-12 RX ORDER — ONDANSETRON 2 MG/ML
4 INJECTION INTRAMUSCULAR; INTRAVENOUS
Status: DISCONTINUED | OUTPATIENT
Start: 2020-08-12 | End: 2020-08-12 | Stop reason: HOSPADM

## 2020-08-12 RX ORDER — OXYCODONE HYDROCHLORIDE 5 MG/1
5 TABLET ORAL
Status: DISCONTINUED | OUTPATIENT
Start: 2020-08-12 | End: 2020-08-12 | Stop reason: HOSPADM

## 2020-08-12 RX ORDER — MIDAZOLAM HYDROCHLORIDE 1 MG/ML
0.5 INJECTION, SOLUTION INTRAMUSCULAR; INTRAVENOUS
Status: DISCONTINUED | OUTPATIENT
Start: 2020-08-12 | End: 2020-08-12 | Stop reason: HOSPADM

## 2020-08-12 RX ORDER — LORAZEPAM 2 MG/ML
1 INJECTION INTRAMUSCULAR
Status: DISCONTINUED | OUTPATIENT
Start: 2020-08-12 | End: 2020-08-12 | Stop reason: HOSPADM

## 2020-08-12 RX ADMIN — ONDANSETRON HYDROCHLORIDE 4 MG: 2 INJECTION, SOLUTION INTRAMUSCULAR; INTRAVENOUS at 09:07

## 2020-08-12 RX ADMIN — KETOROLAC TROMETHAMINE 30 MG: 30 INJECTION, SOLUTION INTRAMUSCULAR at 18:00

## 2020-08-12 RX ADMIN — FENTANYL CITRATE 25 MCG: 50 INJECTION, SOLUTION INTRAMUSCULAR; INTRAVENOUS at 07:35

## 2020-08-12 RX ADMIN — LIDOCAINE HYDROCHLORIDE 80 MG: 20 INJECTION, SOLUTION EPIDURAL; INFILTRATION; INTRACAUDAL; PERINEURAL at 07:35

## 2020-08-12 RX ADMIN — MIDAZOLAM 2 MG: 1 INJECTION INTRAMUSCULAR; INTRAVENOUS at 07:26

## 2020-08-12 RX ADMIN — SUCCINYLCHOLINE CHLORIDE 130 MG: 20 INJECTION, SOLUTION INTRAMUSCULAR; INTRAVENOUS at 07:35

## 2020-08-12 RX ADMIN — SODIUM CHLORIDE 125 ML/HR: 900 INJECTION, SOLUTION INTRAVENOUS at 10:16

## 2020-08-12 RX ADMIN — FENTANYL CITRATE 25 MCG: 50 INJECTION, SOLUTION INTRAMUSCULAR; INTRAVENOUS at 09:29

## 2020-08-12 RX ADMIN — PROPOFOL 140 MG: 10 INJECTION, EMULSION INTRAVENOUS at 07:35

## 2020-08-12 RX ADMIN — GLYCOPYRROLATE 0.1 MG: 0.2 INJECTION, SOLUTION INTRAMUSCULAR; INTRAVENOUS at 07:26

## 2020-08-12 RX ADMIN — DEXAMETHASONE SODIUM PHOSPHATE 6 MG: 4 INJECTION, SOLUTION INTRAMUSCULAR; INTRAVENOUS at 07:50

## 2020-08-12 RX ADMIN — ROCURONIUM BROMIDE 5 MG: 10 SOLUTION INTRAVENOUS at 07:35

## 2020-08-12 RX ADMIN — ROCURONIUM BROMIDE 10 MG: 10 SOLUTION INTRAVENOUS at 07:56

## 2020-08-12 RX ADMIN — FENTANYL CITRATE 25 MCG: 50 INJECTION, SOLUTION INTRAMUSCULAR; INTRAVENOUS at 09:24

## 2020-08-12 RX ADMIN — GLYCOPYRROLATE 0.3 MG: 0.2 INJECTION, SOLUTION INTRAMUSCULAR; INTRAVENOUS at 09:10

## 2020-08-12 RX ADMIN — KETOROLAC TROMETHAMINE 30 MG: 30 INJECTION, SOLUTION INTRAMUSCULAR at 11:16

## 2020-08-12 RX ADMIN — FENTANYL CITRATE 25 MCG: 50 INJECTION, SOLUTION INTRAMUSCULAR; INTRAVENOUS at 07:58

## 2020-08-12 RX ADMIN — ACETAMINOPHEN 650 MG: 325 TABLET ORAL at 07:09

## 2020-08-12 RX ADMIN — CEFOTETAN DISODIUM 2 G: 2 INJECTION, POWDER, FOR SOLUTION INTRAMUSCULAR; INTRAVENOUS at 07:48

## 2020-08-12 RX ADMIN — ACETAMINOPHEN 650 MG: 325 TABLET ORAL at 13:36

## 2020-08-12 RX ADMIN — NEOSTIGMINE METHYLSULFATE 3 MG: 1 INJECTION, SOLUTION INTRAVENOUS at 09:10

## 2020-08-12 RX ADMIN — CEFAZOLIN SODIUM 2 G: 300 INJECTION, POWDER, LYOPHILIZED, FOR SOLUTION INTRAVENOUS at 16:55

## 2020-08-12 RX ADMIN — ROCURONIUM BROMIDE 35 MG: 10 SOLUTION INTRAVENOUS at 07:38

## 2020-08-12 RX ADMIN — SCOPALAMINE 1 PATCH: 1 PATCH, EXTENDED RELEASE TRANSDERMAL at 07:26

## 2020-08-12 RX ADMIN — SODIUM CHLORIDE, POTASSIUM CHLORIDE, SODIUM LACTATE AND CALCIUM CHLORIDE: 600; 310; 30; 20 INJECTION, SOLUTION INTRAVENOUS at 07:00

## 2020-08-12 NOTE — BRIEF OP NOTE
Brief Postoperative Note    Patient: Gwendolyn Alamo  YOB: 1960  MRN: [id number]    Date of Procedure: 8/12/2020     Pre-Op Diagnosis: UTERINE FIBROIDS    Post-Op Diagnosis: Same as preoperative diagnosis. Procedure(s):  TOTAL LAPAROSCOPIC HYSTERECTOMY, BILATERAL SALPINGOOOPHORECTOMY    Surgeon(s):  Vandana Moore MD    Surgical Assistant: Physician Assistant: Preston Diaz PA-C    Anesthesia: General     Estimated Blood Loss (mL): Minimal    Complications: None    Specimens:   ID Type Source Tests Collected by Time Destination   1 : UTERUS, CERVIX, BILATERAL FALLOPIAN TUBES AND OVARIES Frozen Section Uterus with Bilateral Fallopian tubes and Ovaries  Vandana Moore MD 8/12/2020 0831 Pathology   1 : PELVIC WASHINGS Fresh Pelvis  Vandana Moore MD 8/12/2020 0803 Cytology        Implants: * No implants in log *    Drains:   Orogastric Tube 08/12/20 (Active)       Findings: Enlarged fibroid uterus. Numerous intramural, subserosal, and pedunculated fibroids noted. Normal ovaries bilaterally. Frozen section pathology without evidence of malignancy.     Electronically Signed by Elo Busch MD on 8/12/2020 at 8:50 AM

## 2020-08-12 NOTE — PERIOP NOTES
Patient: Yana Garza MRN: [id number]  SSN: [id number]   YOB: 1960  Age: 62 y.o. Sex: female     Patient is status post Procedure(s):  TOTAL LAPAROSCOPIC HYSTERECTOMY, BILATERAL SALPINGO-OOPHORECTOMY. Surgeon(s) and Role:     Carlos Medrano MD - Primary    Local/Dose/Irrigation:  See [facility]                  Peripheral IV 08/12/20 Left Antecubital (Active)   Site Assessment Clean, dry, & intact 08/12/20 0704   Phlebitis Assessment 0 08/12/20 0704   Infiltration Assessment 0 08/12/20 0704   Dressing Status Clean, dry, & intact; New 08/12/20 0704   Dressing Type Tape;Transparent 08/12/20 0704   Hub Color/Line Status Green; Infusing 08/12/20 0704          Orogastric Tube 08/12/20 (Active)                     Dressing/Packing:  Wound Abdomen 3 TROCAR SITES-Dressing Type: Topical skin adhesive/glue (08/12/20 0901)  Wound Vagina-Dressing Type: Kathy-pad (08/12/20 0901)  :

## 2020-08-12 NOTE — OP NOTES
Gynecologic Oncology Operative Report    Abimael Claire  8/12/2020    Pre-operative dx:  Uterine leiomyoma    Post-operative dx:  Uterine leiomyoma    Procedure: 1) Total laparoscopic hysterectomy    2) Bilateral salpingooophorectomy    Surgeon:  Fay Whatley MD    Assistant:  Ruth Tao PA-C     Anesthesia:  GETA    EBL:  25 cc    Complications:  None    Specimens:  uterus, cervix, bilateral fallopian tubes and ovaries, pelvic washings    Operative indications:  63 yo BF with enlarging postmenopausal fibroids, raising the possibility of sarcoma. Operative findings:  Enlarged fibroid uterus. Numerous intramural, subserosal, and pedunculated fibroids noted. Normal ovaries bilaterally. Frozen section pathology without evidence of malignancy. Procedure in detail:  After the risks, benefits, indications, and alternatives of the procedure were discussed with the patient and informed consent was obtained, the patient was taken to the operating room. She was positively identified, administered general anesthesia, and then placed in the dorsal lithotomy position in [address] [place] [address]. An exam under anesthesia was performed. She was then prepped and draped in the usual fashion. A tracey catheter was inserted, followed by a [employer]-Care uterine manipulator. We then proceeded with laparoscopy by grasping the umbilicus on either side with Allis clamps. A small incision was made at the base with an #11-blade scalpel. A 5 mm blade-less trocar was then directly inserted, with the camera in position to visualize safe entry. Once proper placement was confirmed, the abdomen was then insufflated with carbon dioxide. A 5 mm blade-less trocar was then inserted in each lower quadrant, midway between the anterior superior iliac spine and the umbilicus. These trocars were inserted under direct visualization to ensure safe entry. The abdomen and pelvis were then examined, with the above mentioned findings noted.   Pelvic washings were obtained as well. The patient was then placed in Trendelenburg tilt and we then proceeded with the hysterectomy. The left round ligament was identified, then coagulated and transected with the Harmonic Scalpel, allowing entry into the retroperitoneal space. The vesico-uterine peritoneum was divided with the Harmonic Scalpel from the left side across the midline, allowing visualization of the ring of the V-Care manipulator anteriorly. We then identified the left ureter and bluntly developed the perirectal space. The left uterine artery was identified at its origin off of the hypogastric artery, and it was coagulated and transected with the Harmonic Scalpel at that point, with the ureter being held on traction medially to ensure its safety. The left ovarian vessels were then isolated and then coagulated and transected with the Harmonic Scalpel. The posterior leaf of the broad ligament was then divided with the Harmonic Scalpel up to the level of the uterine body. We then directed our attention to the right side of the pelvis. The right round ligament was identified and then coagulated and transected with the Harmonic Scalpel, allowing entry into the retroperitoneal space. The remaining vesico-uterine peritoneum was then divided with the Harmonic Scalpel on the right side. The right ureter was then identified and the perirectal space was bluntly developed. The right uterine artery was then identified at its origin off of the hypogastric artery. It was coagulated and transected with the Harmonic Scalpel at that point, with the ureter being held on traction medially to ensure its safety. The right ovarian vessels were then isolated and then coagulated and transected with the Harmonic Scalpel. The posterior leaf of the broad ligament was then divided with the Harmonic Scalpel up to the level of the uterine body.   We then moved anteriorly and the bladder was sharply dissected off of the lower uterine segment and cervix until it was well below the ring of the [employer]. The uterine arteries were then skeletonized bilaterally and then coagulated and transected with the Harmonic Scalpel at the level of the V-Care ring. A circumferential colpotomy was then performed by using the active blade of the Harmonic Scalpel to cut down onto the V-Care ring. Once the colpotomy was completed, the specimen was delivered transvaginally and sent to pathology for frozen section evaluation. To remove the mass transvaginally it was placed in a 15 mm Endocatch bag that was inserted transvaginally into the pelvis. The uterus was partially morcellated by hand within the bag to allow for transvaginal removal.  A bulb syringe was then placed into the vagina to maintain pneumoperitoneum for vaginal cuff closure. The cuff was then reapproximated with three figure-of-eight stitches of 2-0 PDS suture using the LSI RD-180 suturing device. The sutures were secured in place with the LSI Ti-Knot device. Excellent reapproximation and hemostasis was noted. The pelvis was then irrigated and all pedicles inspected. Once satisfied with hemostasis, the gas was then allowed to escape from the abdomen and the trocars were removed. She was then taken out of Trendelenburg tilt. The incision sites were closed with a stitch of 4-0 Monocryl, followed by a layer of Dermabond. The bulb syringe was then removed from the vagina. The patient was taken out of stirrups, awakened from anesthesia, and taken to the recovery room in stable condition. All instrument, sponge, and needle counts were correct.         Ana Paula Agrawal MD  8/12/2020  8:51 AM

## 2020-08-12 NOTE — PERIOP NOTES
TRANSFER - OUT REPORT:    Verbal report given to Melissa CARLTON on Crys Sosa  being transferred to room 319 for routine post - op       Report consisted of patients Situation, Background, Assessment and   Recommendations(SBAR). Time Pre op antibiotic given:2 gm cefotetan  Anesthesia Stop time: 4824  Morejon Present on Transfer to floor:no  Order for Morejon on Chart:  Discharge Prescriptions with Chart:    Information from the following report(s) SBAR, OR Summary, Intake/Output and MAR was reviewed with the receiving nurse. Opportunity for questions and clarification was provided. Is the patient on 02? YES       L/Min 2       Other     Is the patient on a monitor? NO    Is the nurse transporting with the patient? NO    Surgical Waiting Area notified of patient's transfer from PACU? NO,, but sister called      The following personal items collected during your admission accompanied patient upon transfer:   Dental Appliance: Dental Appliances: Other (comment)(caps and crowns)  Vision:    Hearing Aid:    Jewelry:    Clothing: Clothing: (clothes bag to pacu)  Other Valuables:  Other Valuables: Cell Phone, Other (comment)(drivers liscense, insurace card credit cards to safe)  Valuables sent to safe:

## 2020-08-12 NOTE — ANESTHESIA PREPROCEDURE EVALUATION
Relevant Problems   No relevant active problems       Anesthetic History   No history of anesthetic complications            Review of Systems / Medical History  Patient summary reviewed, nursing notes reviewed and pertinent labs reviewed    Pulmonary  Within defined limits                 Neuro/Psych   Within defined limits           Cardiovascular  Within defined limits                Exercise tolerance: >4 METS     GI/Hepatic/Renal  Within defined limits       Renal disease: stones       Endo/Other  Within defined limits           Other Findings            Physical Exam    Airway  Mallampati: II  TM Distance: > 6 cm  Neck ROM: normal range of motion   Mouth opening: Normal     Cardiovascular  Regular rate and rhythm,  S1 and S2 normal,  no murmur, click, rub, or gallop             Dental  No notable dental hx       Pulmonary  Breath sounds clear to auscultation               Abdominal  GI exam deferred       Other Findings            Anesthetic Plan    ASA: 2  Anesthesia type: general          Induction: Intravenous  Anesthetic plan and risks discussed with: Patient

## 2020-08-12 NOTE — DISCHARGE INSTRUCTIONS
[facility] [address], [address]  P [phone]  F [phone]     Dinah Macias      Dear Ms. Herlinda Garces,      Please review your instructions with your nurse and ask any questions so you have all the information you need to recover well at home. If you do not feel you have everything you need to succeed and be safe after you leave the hospital, please discuss these concerns with your nurse. As always, call for any questions at home. Your doctor: Dr. Christie Hoyos  Diagnosis: Leiomyoma of uterus [D25.9]  Procedure: Procedure(s):  TOTAL LAPAROSCOPIC HYSTERECTOMY, BILATERAL SALPINGO-OOPHORECTOMY  Date of Discharge: 8/12/2020      Take Home Medications     See Discharge Medication Review provided to you by your nurse. If you did not receive one, request this prior to your discharge. · It is important that you take your medications as they are prescribed. · Keep your medications in the bottles provided by the pharmacist and keep a list of the medication names, dosages, times to be taken and what they are for in your wallet. · Do not take other medications without consulting your doctor. · If you no longer need your prescribed pain medication, you may take Tylenol or Aleve alone. · You should take a daily gentle stool softener such as a colace pill or dulcolax suppository for constipation as this is not uncommon after surgery and/or while on pain medication. If not prescribed, this can be found over the counter. If constipation persists for >24 hours you should take a dose of Milk of Magnesia. Call if your constipation continues. Diet    · Stay hydrated and drink fluids such as gatorade and water. This will also help prevent constipation and dehydration. Limit somewhat any usual caffeine intake of beverages such as soda, tea and coffee as this may serve to dehydrate you.   · For the first 2-3 days keep a low fiber diet avoiding raw vegetables or fruits with skin. A diet consisting of soup, cereal, yogurt, eggs, fish, Boost/Ensure. Avoid fatty/greasy foods. · If nauseated, keep your diet limited to liquids and call if this persists. Activity    · If possible, have someone with you at all times until you feel stable. · Gradually increase your activity each day. There are generally no restrictions on walking, climbing stairs or riding in a car. Try to walk at least 4 times per day. · Showers are okay. If you have an incision, no tub bathing/swimming for two weeks. · No driving for 2 week and/or while on pain medication. · There are no lifting restrictions if you did not have surgery. · If you had surgery, the following restrictions are in place:  1. If you had a laparoscopic procedure, no lifting greater than 25 lbs for 3 weeks. 2. If you had an open procedure, no heavy lifting greater than 15 lbs for 8 weeks. 3. If you had a hysterectomy, nothing per vagina for 6-8 weeks. 4. If you had any type of vaginal procedure, you may experience vaginal spotting. Should the bleeding require more that 4 pads a day please call our office. Incision    · You should expect some discomfort in the area of your incision, particularly as you increase your activity. If you notice an area of increasing redness or new drainage, please call your doctor. · Staples are generally removed in 10-14 days. · Many incisions will have buried absorbable sutures, which do not need to be removed and are covered by protective glue. This will come off over time. Causes For Concern    If any of the following occur, please call our office and speak with the Nurse/aid who will help you with your problem or ask the doctor to call you.  Problems with the incision, including increasing pain, swelling, redness or drainage.  Inability to pass urine    Increasing abdominal pain despite medication   Persisting nausea or vomiting.     Fever or chills and a temperature >101F   Constipation (no bowel movement for three days).  Diarrhea (more than three watery stools within 24 hours).  Excessive vaginal or wound bleeding.  If after hours and you cannot reach an on-call physician, call 911. Follow-Up    Call [phone] to schedule an appointment with Dr. Taylor Earl in 4 weeks. Information obtained by :  I understand that if any problems occur once I am at home I am to contact my physician. I understand and acknowledge receipt of the instructions indicated above.                                                                                                                                            Physician's or R.N.'s Signature                                                                  Date/Time                                                                                                                                              Patient or Representative Signature                                                          Date/Time

## 2020-08-12 NOTE — PROGRESS NOTES
TRANSFER - IN REPORT:    Verbal report received from [facility]) on Debbie Koch  being received from PACU(unit) for routine progression of care      Report consisted of patients Situation, Background, Assessment and   Recommendations(SBAR). Information from the following report(s) SBAR, Kardex, OR Summary, Procedure Summary, Intake/Output and MAR was reviewed with the receiving nurse. Opportunity for questions and clarification was provided. Assessment completed upon patients arrival to unit and care assumed. I have reviewed discharge instructions with the patient. The patient verbalized understanding. Discharge medications reviewed with patient and appropriate educational materials and side effects teaching were provided.

## 2020-08-12 NOTE — ANESTHESIA POSTPROCEDURE EVALUATION
Post-Anesthesia Evaluation and Assessment    Patient: Yana Garza MRN: [id number]  SSN: [id number]    YOB: 1960  Age: 62 y.o. Sex: female      I have evaluated the patient and they are stable and ready for discharge from the PACU. Cardiovascular Function/Vital Signs  Visit Vitals  /66   Pulse 82   Temp 36.2 °C (97.1 °F)   Resp 14   Ht 5' 9.5\" (1.765 m)   Wt 63.1 kg (139 lb 2.4 oz)   SpO2 100%   BMI 20.25 kg/m²       Patient is status post General anesthesia for Procedure(s):  TOTAL LAPAROSCOPIC HYSTERECTOMY, BILATERAL SALPINGO-OOPHORECTOMY. Nausea/Vomiting: None    Postoperative hydration reviewed and adequate. Pain:  Pain Scale 1: Numeric (0 - 10) (08/12/20 1000)  Pain Intensity 1: 0 (08/12/20 1000)   Managed    Neurological Status:   Neuro (WDL): Exceptions to WDL(drowsy) (08/12/20 1000)  Neuro  LUE Motor Response: Purposeful (08/12/20 1000)  LLE Motor Response: Purposeful (08/12/20 1000)  RUE Motor Response: Purposeful (08/12/20 1000)  RLE Motor Response: Purposeful (08/12/20 1000)   At baseline    Mental Status, Level of Consciousness: Alert and  oriented to person, place, and time    Pulmonary Status:   O2 Device: Nasal cannula (08/12/20 1000)   Adequate oxygenation and airway patent    Complications related to anesthesia: None    Post-anesthesia assessment completed. No concerns    Signed By: Severa Dowdy, MD     August 12, 2020              Procedure(s):  TOTAL LAPAROSCOPIC HYSTERECTOMY, BILATERAL SALPINGO-OOPHORECTOMY. general    Anesthesia Post Evaluation        Patient location during evaluation: PACU  Note status: Adequate.   Level of consciousness: responsive to verbal stimuli and sleepy but conscious  Pain management: satisfactory to patient  Airway patency: patent  Anesthetic complications: no  Cardiovascular status: acceptable  Respiratory status: acceptable  Hydration status: acceptable  Comments: +Post-Anesthesia Evaluation and Assessment    Patient: Heidi Araujo Roberts MRN: [id number]  SSN: [id number]   YOB: 1960  Age: 62 y.o. Sex: female          Cardiovascular Function/Vital Signs    /61 (BP 1 Location: Right arm, BP Patient Position: At rest)   Pulse 89   Temp 36.9 °C (98.4 °F)   Resp 16   Ht 5' 9.5\" (1.765 m)   Wt 63.1 kg (139 lb 2.4 oz)   SpO2 97%   BMI 20.25 kg/m²     Patient is status post Procedure(s):  TOTAL LAPAROSCOPIC HYSTERECTOMY, BILATERAL SALPINGO-OOPHORECTOMY. Nausea/Vomiting: Controlled. Postoperative hydration reviewed and adequate. Pain:  Pain Scale 1: Numeric (0 - 10) (08/12/20 1208)  Pain Intensity 1: 3 (08/12/20 1208)   Managed. Neurological Status:   Neuro (L): Exceptions to [place]) (08/12/20 1000)   At baseline. Mental Status and Level of Consciousness: Arousable. Pulmonary Status:   O2 Device: Room air (08/12/20 1208)   Adequate oxygenation and airway patent. Complications related to anesthesia: None    Post-anesthesia assessment completed. No concerns. I have evaluated the patient and the patient is stable and ready to be discharged from PACU . Signed By: Richelle Calero MD    8/12/2020        INITIAL Post-op Vital signs:   Vitals Value Taken Time   /66 8/12/2020 10:15 AM   Temp 36.2 °C (97.1 °F) 8/12/2020 10:00 AM   Pulse 80 8/12/2020 10:21 AM   Resp 13 8/12/2020 10:21 AM   SpO2 100 % 8/12/2020 10:21 AM   Vitals shown include unvalidated device data.

## 2020-09-10 ENCOUNTER — OFFICE VISIT (OUTPATIENT)
Dept: GYNECOLOGY | Age: 60
End: 2020-09-10
Payer: COMMERCIAL

## 2020-09-10 VITALS
SYSTOLIC BLOOD PRESSURE: 108 MMHG | HEART RATE: 75 BPM | DIASTOLIC BLOOD PRESSURE: 62 MMHG | WEIGHT: 141 LBS | HEIGHT: 69 IN | BODY MASS INDEX: 20.88 KG/M2

## 2020-09-10 DIAGNOSIS — D25.2 INTRAMURAL AND SUBSEROUS LEIOMYOMA OF UTERUS: Primary | ICD-10-CM

## 2020-09-10 DIAGNOSIS — D25.1 INTRAMURAL AND SUBSEROUS LEIOMYOMA OF UTERUS: Primary | ICD-10-CM

## 2020-09-10 DIAGNOSIS — N85.8 UTERINE MASS: ICD-10-CM

## 2020-09-10 PROCEDURE — 99024 POSTOP FOLLOW-UP VISIT: CPT | Performed by: OBSTETRICS & GYNECOLOGY

## 2020-09-10 NOTE — LETTER
NOTIFICATION OF RETURN TO WORK 
 
9/10/2020 3:10 PM 
 
Ms. Mario Gacria 
[address] To Whom It May Concern: 
 
Mario Garcia was under my care. She was out of work from 8/12/20 to 9/26/20 due to surgery. If there are questions or concerns please have the patient contact our office.  
 
Sincerely,

## 2020-09-10 NOTE — PROGRESS NOTES
[facility] [address], [address] [facility]  P [phone]  F [phone]    Postoperative Office Note  Patient ID:  Name: Paul Dsouza  MRM: [id number]  : 1960/60 y.o. Date: 9/10/2020    Diagnosis:     ICD-10-CM ICD-9-CM    1. Intramural and subserous leiomyoma of uterus  D25.1 218.1     D25.2 218.2    2. Uterine mass  N85.8 625.8        Problem List:   Patient Active Problem List   Diagnosis Code    Leiomyoma of uterus D25.9       SUBJECTIVE:    Paul Dsouza is a 62 y.o. female who presents for followup postoperative care following TLH, BSO. Operative findings:  Enlarged fibroid uterus. Numerous intramural, subserosal, and pedunculated fibroids noted. Normal ovaries bilaterally. Frozen section pathology without evidence of malignancy. The patient's pathology revealed: FINAL PATHOLOGIC DIAGNOSIS   Uterus, cervix, bilateral fallopian tubes and ovaries; simple hysterectomy, BSO:   Myometrium: Multiple leiomyomas, some submucosal, some calcified (110 g specimen)   Endometrium: Atrophic with small benign polyp   Cervix: No pathologic diagnosis   Bilateral fallopian tubes: No pathologic diagnosis   Bilateral ovaries: No pathologic diagnosis   Serosa: No pathologic diagnosis     Currently she has no problems with eating, bowel movements, voiding, or their wound. Appetite is good. Eating a regular diet without difficulty. Bowel movements are regular. The patient is not having any pain. Medications:     Current Outpatient Medications on File Prior to Visit   Medication Sig Dispense Refill    acetaminophen (TYLENOL) 325 mg tablet Take 2 Tabs by mouth every six (6) hours. 40 Tab 0    ibuprofen (MOTRIN) 600 mg tablet Take 1 Tab by mouth every eight (8) hours as needed for Pain. 40 Tab 0    docusate sodium (COLACE) 100 mg capsule Take 1 Cap by mouth two (2) times daily as needed for Constipation for up to 30 days.  60 Cap 0    cholecalciferol, vitamin D3, (Vitamin D3) 50 mcg (2,000 unit) tab Take 1 Tab by mouth daily. No current facility-administered medications on file prior to visit. Allergies: Allergies   Allergen Reactions    Adhesive Tape-Silicones Rash    Pcn [Penicillins] Nausea Only     Past Medical History:   Diagnosis Date    Colon polyps     Fibrocystic breast     Fibroids     Kidney stones     Motion sickness      Past Surgical History:   Procedure Laterality Date    HX BREAST BIOPSY Left     years ago, benign, no scar    HX BREAST BIOPSY  2013    Fragments of benign breast tissue with focal scant microcalcifications.  Focal reactive lymphohistiocytic infiltrate    HX CERVICAL POLYPECTOMY  2010    HX COLONOSCOPY      benign polyp    HX HYSTEROSCOPY      resection of endometrial polyp    HX OTHER SURGICAL  2007, 2008    Endometrial biopsy- polyp noted    HX UROLOGICAL      scope removal of stone     SHANE BIOPSY BREAST STEREOTACTIC Left 02/2014    benign     Social History     Socioeconomic History    Marital status: SINGLE     Spouse name: Not on file    Number of children: Not on file    Years of education: Not on file    Highest education level: Not on file   Occupational History    Not on file   Social Needs    Financial resource strain: Not on file    Food insecurity     Worry: Not on file     Inability: Not on file    Transportation needs     Medical: Not on file     Non-medical: Not on file   Tobacco Use    Smoking status: Never Smoker    Smokeless tobacco: Never Used   Substance and Sexual Activity    Alcohol use: Never     Frequency: Never    Drug use: Never    Sexual activity: Not on file   Lifestyle    Physical activity     Days per week: Not on file     Minutes per session: Not on file    Stress: Not on file   Relationships    Social connections     Talks on phone: Not on file     Gets together: Not on file     Attends Jain service: Not on file     Active member of club or organization: Not on file     Attends meetings of clubs or organizations: Not on file     Relationship status: Not on file    Intimate partner violence     Fear of current or ex partner: Not on file     Emotionally abused: Not on file     Physically abused: Not on file     Forced sexual activity: Not on file   Other Topics Concern    Not on file   Social History Narrative    ** Merged History Encounter **            OBJECTIVE:    Vitals:   Vitals:    09/10/20 1444   BP: 108/62   Pulse: 75   Weight: 141 lb (64 kg)   Height: 5' 9.49\" (1.765 m)     Physical Examination:     General:  alert, cooperative, no distress   Lungs: lungs clear to auscultation   Cardiac: Regular rate and rhythm   Abdomen: soft, bowel sounds active, non-tender  No CVA tenderness   Incision: healing well   Pelvic: External genitalia: normal general appearance  Urinary system: urethral meatus normal  Vaginal: normal without tenderness, induration or masses  Cervix: absent  Adnexa: removed surgically  Uterus: absent   Rectal: not done   Extremity:   extremities normal, atraumatic, no cyanosis or edema     IMPRESSION:    Fidelia Becerril is doing well postoperatively. She has no apparent complications from surgery and no evidence of malignancy. Medical problems:     Patient Active Problem List   Diagnosis Code    Leiomyoma of uterus D25.9       PLAN:    The operative procedures and clinical results have been reviewed with the patient. Implications of diagnosis discussed at length. All questions answered. The patient may return to full activity. I will see the patient back prn. She may resume gynecologic care with Dr. Kayla Healy. The patient is advised to call our office with any problems or concerns.     Aniyah Levin MD  9/10/2020/2:48 PM

## 2020-09-10 NOTE — LETTER
NOTIFICATION OF RETURN TO WORK  
 
9/10/2020 3:14 PM 
 
Ms. Mario Garcia 
[address] [id number] [facility] To Whom It May Concern: 
 
Mario Garcia was under my care. She was out of work from 8/12/20 to 8/26/20. If there are questions or concerns please have the patient contact our office.  
 
Sincerely,

## 2020-11-23 ENCOUNTER — TRANSCRIBE ORDER (OUTPATIENT)
Dept: SCHEDULING | Age: 60
End: 2020-11-23

## 2020-11-23 DIAGNOSIS — Z12.31 VISIT FOR SCREENING MAMMOGRAM: Primary | ICD-10-CM

## 2020-12-19 ENCOUNTER — HOSPITAL ENCOUNTER (OUTPATIENT)
Dept: MAMMOGRAPHY | Age: 60
Discharge: HOME | End: 2020-12-19
Attending: OBSTETRICS & GYNECOLOGY
Payer: COMMERCIAL

## 2020-12-19 DIAGNOSIS — Z12.31 VISIT FOR SCREENING MAMMOGRAM: ICD-10-CM

## 2020-12-19 PROCEDURE — 77063 BREAST TOMOSYNTHESIS BI: CPT

## 2021-03-26 ENCOUNTER — TELEPHONE (OUTPATIENT)
Dept: GYNECOLOGY | Age: 61
End: 2021-03-26

## 2021-03-26 NOTE — TELEPHONE ENCOUNTER
Patient c/o intermittent llq pain since her surgery. It is more discomfort then pain. She reports normal bowels and bladder. Her last colonoscopy was three years ago. She has also put a call into her PCP. I scheduled her to see  on 3/30 to evaluate.

## 2021-03-29 NOTE — PROGRESS NOTES
Patient complains of pain in her lower left abdomen since her surgery. 1. Have you been to the ER, urgent care clinic since your last visit? Hospitalized since your last visit? No    2. Have you seen or consulted any other health care providers outside of the [address] [place] [address] since your last visit? Include any pap smears or colon screening.  No

## 2021-03-30 ENCOUNTER — OFFICE VISIT (OUTPATIENT)
Dept: GYNECOLOGY | Age: 61
End: 2021-03-30
Payer: COMMERCIAL

## 2021-03-30 VITALS
HEART RATE: 68 BPM | SYSTOLIC BLOOD PRESSURE: 109 MMHG | HEIGHT: 69 IN | DIASTOLIC BLOOD PRESSURE: 71 MMHG | WEIGHT: 146 LBS | BODY MASS INDEX: 21.62 KG/M2

## 2021-03-30 DIAGNOSIS — R10.32 LEFT LOWER QUADRANT ABDOMINAL PAIN: Primary | ICD-10-CM

## 2021-03-30 DIAGNOSIS — N20.0 NEPHROLITHIASIS: ICD-10-CM

## 2021-03-30 PROCEDURE — 99213 OFFICE O/P EST LOW 20 MIN: CPT | Performed by: OBSTETRICS & GYNECOLOGY

## 2021-03-30 NOTE — PROGRESS NOTES
[address] [facility] [address]  P [phone]  F [phone]    Office Note  Patient ID:  Name:  Alysia Todd  MRN:  [id number]  :  1960/60 y.o. Date:  3/30/2021      HISTORY OF PRESENT ILLNESS:  Alysia Todd is a 62 y.o.  postmenopausal female who is an established patient who was referred to me for enlarging fibroids in menopause by Dr. Richelle Causey. I took her to the OR on 20 for TLH, BSO. Operative findings demonstrated an enlarged fibroid uterus, with numerous intramural, subserosal, and pedunculated fibroids noted. She had normal ovaries bilaterally. Final pathology revealed no evidence of malignancy. I saw her back for her postoperative visit in 2020. I released her at that time and suggested she resume routine gynecologic care with Dr. Romulo Miles, though she no longer needs screening pap smears. She presents today complaining of abdominal pain, specifically in the LLQ. The pain is better today than it was over the weekend. She has a history of kidney stones and is scheduled to see her urologist this week. Her last colonoscopy was in . At that time she has some polyps and diverticula. ROS:   and GI review:  Negative  Cardiopulmonary review:  Negative   Musculoskeletal:  Negative    A comprehensive review of systems was negative except for that written in the History of Present Illness. , 10 point ROS      OB/GYN ROS/HX:    Patient denies any abnormal bleeding or vaginal discharge.        Problem List:  Patient Active Problem List    Diagnosis Date Noted    Leiomyoma of uterus 2020     PMH:  Past Medical History:   Diagnosis Date    Colon polyps     Fibrocystic breast     Fibroids     Kidney stones     Motion sickness       PSH:  Past Surgical History:   Procedure Laterality Date    HX BREAST BIOPSY Right     years ago, benign, surgical, no scar seen    HX BREAST BIOPSY Right 2013    Fragments of benign breast tissue with focal scant microcalcifications. Focal reactive lymphohistiocytic infiltrate    HX CERVICAL POLYPECTOMY  2010    HX COLONOSCOPY      benign polyp    HX HYSTEROSCOPY      resection of endometrial polyp    HX OTHER SURGICAL  2007, 2008    Endometrial biopsy- polyp noted    HX UROLOGICAL      scope removal of stone       Social History:  Social History     Tobacco Use    Smoking status: Never Smoker    Smokeless tobacco: Never Used   Substance Use Topics    Alcohol use: Never     Frequency: Never      Family History:  Family History   Problem Relation Age of Onset    Lung Cancer Father     Breast Cancer Maternal Aunt         60's    Anesth Problems Neg Hx       Medications: (reviewed)  Current Outpatient Medications   Medication Sig    cholecalciferol, vitamin D3, (Vitamin D3) 50 mcg (2,000 unit) tab Take 1 Tab by mouth daily.  acetaminophen (TYLENOL) 325 mg tablet Take 2 Tabs by mouth every six (6) hours.  ibuprofen (MOTRIN) 600 mg tablet Take 1 Tab by mouth every eight (8) hours as needed for Pain. No current facility-administered medications for this visit. Allergies: (reviewed)  Allergies   Allergen Reactions    Adhesive Tape-Silicones Rash    Pcn [Penicillins] Nausea Only          OBJECTIVE:    Physical Exam:  VITAL SIGNS: Vitals:    03/30/21 0930   BP: 109/71   Pulse: 68   Weight: 146 lb (66.2 kg)   Height: 5' 9.49\" (1.765 m)     Body mass index is 21.26 kg/m². GENERAL AXEL: Conversant, alert, oriented. No acute distress. HEENT: HEENT. No thyroid enlargement. No JVD. Neck: Supple without restrictions. RESPIRATORY: Clear to auscultation and percussion to the bases. No CVAT. CARDIOVASC: RRR without murmur/rub.    GASTROINT: soft, non-tender, without masses or organomegaly   MUSCULOSKEL: no joint tenderness, deformity or swelling   EXTREMITIES: extremities normal, atraumatic, no cyanosis or edema   PELVIC: Normal external genitalia. Normal vagina. Uterus, cervix, and adnexa absent. No masses appreciated. RECTAL: Deferred   LEXIE SURVEY: No suspicious lymphadenopathy or edema noted. NEURO: Grossly intact. No acute deficit. Lab Data:    Lab Results   Component Value Date/Time    WBC 3.1 (L) 07/28/2020 08:34 AM    HGB 11.7 07/28/2020 08:34 AM    HCT 37.3 07/28/2020 08:34 AM    PLATELET 419 41/78/7038 08:34 AM    MCV 94.0 07/28/2020 08:34 AM     Lab Results   Component Value Date/Time    Sodium 142 07/28/2020 08:34 AM    Potassium 4.0 07/28/2020 08:34 AM    Chloride 108 07/28/2020 08:34 AM    CO2 29 07/28/2020 08:34 AM    Anion gap 5 07/28/2020 08:34 AM    Glucose 80 07/28/2020 08:34 AM    BUN 17 07/28/2020 08:34 AM    Creatinine 0.73 07/28/2020 08:34 AM    BUN/Creatinine ratio 23 (H) 07/28/2020 08:34 AM    GFR est AA >60 07/28/2020 08:34 AM    GFR est non-AA >60 07/28/2020 08:34 AM    Calcium 9.0 07/28/2020 08:34 AM         Imaging:  None      IMPRESSION/PLAN:  Ginger Marie is a 62 y.o. female with a diagnosis of left lower quadrant pain of uncertain etiology. I reviewed with Ginger Marie her medical records, physical exam, and review of symptoms. I don't think her pain is related to her hysterectomy, unless it is due to a postoperative adhesion. I think a kidney stone would be the most likely culprit. Diverticular disease would also be a possibility. I am going to send her for a CT of the abdomen/pelvis with and without contrast to evaluate for the source of her pain. She is scheduled to see urology this week and is supposed to follow up with gastroenterology. We will let her know what the scan shows. An electronic signature was used to sign this note.     Sarah Khalil MD  03/30/21

## 2021-04-03 ENCOUNTER — HOSPITAL ENCOUNTER (OUTPATIENT)
Dept: CT IMAGING | Age: 61
Discharge: HOME | End: 2021-04-03
Attending: OBSTETRICS & GYNECOLOGY
Payer: COMMERCIAL

## 2021-04-03 DIAGNOSIS — N20.0 NEPHROLITHIASIS: ICD-10-CM

## 2021-04-03 DIAGNOSIS — R10.32 LEFT LOWER QUADRANT ABDOMINAL PAIN: ICD-10-CM

## 2021-04-03 PROCEDURE — 74177 CT ABD & PELVIS W/CONTRAST: CPT

## 2021-04-03 PROCEDURE — 74011000636 HC RX REV CODE- 636: Performed by: RADIOLOGY

## 2021-04-03 RX ADMIN — IOPAMIDOL 100 ML: 755 INJECTION, SOLUTION INTRAVENOUS at 12:21

## 2021-04-06 ENCOUNTER — VIRTUAL VISIT (OUTPATIENT)
Dept: GYNECOLOGY | Age: 61
End: 2021-04-06
Payer: COMMERCIAL

## 2021-04-06 DIAGNOSIS — N20.0 NEPHROLITHIASIS: ICD-10-CM

## 2021-04-06 DIAGNOSIS — R10.32 LEFT LOWER QUADRANT ABDOMINAL PAIN: Primary | ICD-10-CM

## 2021-04-06 PROCEDURE — 99213 OFFICE O/P EST LOW 20 MIN: CPT | Performed by: OBSTETRICS & GYNECOLOGY

## 2021-04-06 NOTE — PROGRESS NOTES
27 [facility] [address] [facility]  P [phone]  F [phone]    Office Note  Patient ID:  Name:  Karissa Rob  MRN:  [id number]  :  1960/60 y.o. Date:  2021      HISTORY OF PRESENT ILLNESS:  Karissa Rob is a 62 y.o.  postmenopausal female who is an established patient who was referred to me for enlarging fibroids in menopause by Dr. Odalys Rivers. I took her to the OR on 20 for TLH, BSO. Operative findings demonstrated an enlarged fibroid uterus, with numerous intramural, subserosal, and pedunculated fibroids noted. She had normal ovaries bilaterally. Final pathology revealed no evidence of malignancy. I saw her back for her postoperative visit in 2020. I released her at that time and suggested she resume routine gynecologic care with Dr. Garima Álvarez, though she no longer needs screening pap smears. She presented last week complaining of abdominal pain, specifically in the LLQ. The pain was better at the time of the visit than it was over the weekend prior to presentation. She has a history of kidney stones and was scheduled to see her urologist later in the week. Her last colonoscopy was in . At that time she has some polyps and diverticula. I sent her for a CT of the abdomen/pelvis to evaluate her pain. She presents today for the results. ROS:   and GI review:  Negative  Cardiopulmonary review:  Negative   Musculoskeletal:  Negative    A comprehensive review of systems was negative except for that written in the History of Present Illness. , 10 point ROS      OB/GYN ROS/HX:    Patient denies any abnormal bleeding or vaginal discharge.        Problem List:  Patient Active Problem List    Diagnosis Date Noted    Leiomyoma of uterus 2020     PMH:  Past Medical History:   Diagnosis Date    Colon polyps     Fibrocystic breast     Fibroids     Kidney stones     Motion sickness       PSH:  Past Surgical History:   Procedure Laterality Date    HX BREAST BIOPSY Right     years ago, benign, surgical, no scar seen    HX BREAST BIOPSY Right 2013    Fragments of benign breast tissue with focal scant microcalcifications. Focal reactive lymphohistiocytic infiltrate    HX CERVICAL POLYPECTOMY  2010    HX COLONOSCOPY      benign polyp    HX HYSTEROSCOPY      resection of endometrial polyp    HX OTHER SURGICAL  2007, 2008    Endometrial biopsy- polyp noted    HX UROLOGICAL      scope removal of stone       Social History:  Social History     Tobacco Use    Smoking status: Never Smoker    Smokeless tobacco: Never Used   Substance Use Topics    Alcohol use: Never     Frequency: Never      Family History:  Family History   Problem Relation Age of Onset    Lung Cancer Father     Breast Cancer Maternal Aunt         60's    Anesth Problems Neg Hx       Medications: (reviewed)  Current Outpatient Medications   Medication Sig    acetaminophen (TYLENOL) 325 mg tablet Take 2 Tabs by mouth every six (6) hours.  ibuprofen (MOTRIN) 600 mg tablet Take 1 Tab by mouth every eight (8) hours as needed for Pain.  cholecalciferol, vitamin D3, (Vitamin D3) 50 mcg (2,000 unit) tab Take 1 Tab by mouth daily. No current facility-administered medications for this visit. Allergies: (reviewed)  Allergies   Allergen Reactions    Adhesive Tape-Silicones Rash    Pcn [Penicillins] Nausea Only          OBJECTIVE:    Physical Exam:  VITAL SIGNS: There were no vitals filed for this visit. There is no height or weight on file to calculate BMI.    GENERAL AXEL:    HEENT:    RESPIRATORY:    CARDIOVASC:    GASTROINT:    MUSCULOSKEL:    EXTREMITIES:    PELVIC:    RECTAL:    LEXIE SURVEY:    NEURO:        Lab Data:    Lab Results   Component Value Date/Time    WBC 3.1 (L) 07/28/2020 08:34 AM    HGB 11.7 07/28/2020 08:34 AM    HCT 37.3 07/28/2020 08:34 AM    PLATELET 961 57/73/3026 08:34 AM    MCV 94.0 07/28/2020 08:34 AM     Lab Results   Component Value Date/Time    Sodium 142 07/28/2020 08:34 AM    Potassium 4.0 07/28/2020 08:34 AM    Chloride 108 07/28/2020 08:34 AM    CO2 29 07/28/2020 08:34 AM    Anion gap 5 07/28/2020 08:34 AM    Glucose 80 07/28/2020 08:34 AM    BUN 17 07/28/2020 08:34 AM    Creatinine 0.73 07/28/2020 08:34 AM    BUN/Creatinine ratio 23 (H) 07/28/2020 08:34 AM    GFR est AA >60 07/28/2020 08:34 AM    GFR est non-AA >60 07/28/2020 08:34 AM    Calcium 9.0 07/28/2020 08:34 AM         CT of abdomen/pelvis (4/3/21)  LOWER THORAX: No significant abnormality in the incidentally imaged lower chest.  LIVER: Several small hypodensities in the liver are too small fully catheterize,  but likely represent cysts. BILIARY TREE: Gallbladder is within normal limits. CBD is not dilated. SPLEEN: within normal limits. PANCREAS: No mass or ductal dilatation. ADRENALS: Unremarkable. KIDNEYS: There is a 7 mm stone in the proximal left ureter. There is a 1.7 x 1.0  cm stone in the left renal pelvis. Several nonobstructive stones are seen in the  lower pole of left kidney. There is moderate left hydronephrosis. There is a 2.3  cm cyst in the posterior left kidney. There is a 2.1 cm cyst in the superior  left kidney. There is a prominent extrarenal pelvis on the right. No right  hydronephrosis. STOMACH: Unremarkable. SMALL BOWEL: No dilatation or wall thickening. COLON: No dilatation or wall thickening. APPENDIX: Unremarkable  PERITONEUM: No ascites or pneumoperitoneum. RETROPERITONEUM: No lymphadenopathy or aortic aneurysm. REPRODUCTIVE ORGANS: The patient is status post hysterectomy. URINARY BLADDER: No mass or calculus. BONES: No destructive bone lesion. ABDOMINAL WALL: No mass or hernia. ADDITIONAL COMMENTS: N/A     IMPRESSION  7 mm stone in the proximal left ureter. Larger stone in the left renal pelvis. Nonobstructive nephrolithiasis in the inferior left kidney.  Moderate left  hydronephrosis. IMPRESSION/PLAN:  Jose Carlos Umanzor is a 62 y.o. female with a diagnosis of left lower quadrant pain of uncertain etiology. I didn't think her pain was related to her prior hysterectomy, unless it was due to a postoperative adhesion. I thought a kidney stone would be the most likely culprit, considering her history. Diverticular disease would also be a possibility. Her CT demonstrates two kidney stones on the left, one in the left proximal ureter and one in the left renal pelvis. There was also moderate left hydronephrosis. I am going to forward the results to her urologist, Dr. Kristy Saab. She may ultimately need a lithotripsy. An electronic signature was used to sign this note.     Ciera Melo MD  04/06/21

## 2021-12-17 ENCOUNTER — TRANSCRIBE ORDER (OUTPATIENT)
Dept: SCHEDULING | Age: 61
End: 2021-12-17

## 2021-12-17 DIAGNOSIS — Z12.31 SCREENING MAMMOGRAM FOR HIGH-RISK PATIENT: Primary | ICD-10-CM

## 2022-01-14 ENCOUNTER — HOSPITAL ENCOUNTER (OUTPATIENT)
Dept: MAMMOGRAPHY | Age: 62
Discharge: HOME | End: 2022-01-14
Attending: OBSTETRICS & GYNECOLOGY
Payer: COMMERCIAL

## 2022-01-14 DIAGNOSIS — Z12.31 SCREENING MAMMOGRAM FOR HIGH-RISK PATIENT: ICD-10-CM

## 2022-01-14 PROCEDURE — 77063 BREAST TOMOSYNTHESIS BI: CPT

## 2022-03-18 PROBLEM — D25.9 LEIOMYOMA OF UTERUS: Status: ACTIVE | Noted: 2020-08-12

## 2022-12-13 ENCOUNTER — TRANSCRIBE ORDER (OUTPATIENT)
Dept: SCHEDULING | Age: 62
End: 2022-12-13

## 2022-12-13 DIAGNOSIS — Z12.31 ENCOUNTER FOR SCREENING MAMMOGRAM FOR MALIGNANT NEOPLASM OF BREAST: Primary | ICD-10-CM

## 2022-12-13 DIAGNOSIS — Z13.820 ENCOUNTER FOR SCREENING FOR OSTEOPOROSIS: Primary | ICD-10-CM

## 2023-01-30 ENCOUNTER — HOSPITAL ENCOUNTER (OUTPATIENT)
Dept: MAMMOGRAPHY | Age: 63
Discharge: HOME | End: 2023-01-30
Attending: FAMILY MEDICINE
Payer: COMMERCIAL

## 2023-01-30 DIAGNOSIS — Z13.820 ENCOUNTER FOR SCREENING FOR OSTEOPOROSIS: ICD-10-CM

## 2023-01-30 PROCEDURE — 77080 DXA BONE DENSITY AXIAL: CPT

## 2023-03-16 ENCOUNTER — HOSPITAL ENCOUNTER (OUTPATIENT)
Dept: MAMMOGRAPHY | Age: 63
Discharge: HOME | End: 2023-03-16
Attending: FAMILY MEDICINE
Payer: COMMERCIAL

## 2023-03-16 DIAGNOSIS — Z12.31 ENCOUNTER FOR SCREENING MAMMOGRAM FOR MALIGNANT NEOPLASM OF BREAST: ICD-10-CM

## 2023-03-16 PROCEDURE — 77063 BREAST TOMOSYNTHESIS BI: CPT

## 2024-03-18 ENCOUNTER — HOSPITAL ENCOUNTER (OUTPATIENT)
Facility: HOSPITAL | Age: 64
Discharge: HOME | End: 2024-03-21
Payer: COMMERCIAL

## 2024-03-18 VITALS — BODY MASS INDEX: 19.55 KG/M2 | HEIGHT: 69 IN | WEIGHT: 132 LBS

## 2024-03-18 DIAGNOSIS — Z12.31 ENCOUNTER FOR SCREENING MAMMOGRAM FOR MALIGNANT NEOPLASM OF BREAST: ICD-10-CM

## 2024-03-18 PROCEDURE — 77063 BREAST TOMOSYNTHESIS BI: CPT

## 2024-06-12 ENCOUNTER — HOSPITAL ENCOUNTER (OUTPATIENT)
Facility: HOSPITAL | Age: 64
Discharge: HOME | End: 2024-06-15
Payer: COMMERCIAL

## 2024-06-12 DIAGNOSIS — R10.13 EPIGASTRIC PAIN: ICD-10-CM

## 2024-06-12 PROCEDURE — 76700 US EXAM ABDOM COMPLETE: CPT

## 2025-02-14 ENCOUNTER — TRANSCRIBE ORDERS (OUTPATIENT)
Facility: HOSPITAL | Age: 65
End: 2025-02-14

## 2025-02-14 DIAGNOSIS — M81.0 AGE-RELATED OSTEOPOROSIS WITHOUT CURRENT PATHOLOGICAL FRACTURE: Primary | ICD-10-CM

## 2025-02-14 DIAGNOSIS — Z12.31 VISIT FOR SCREENING MAMMOGRAM: Primary | ICD-10-CM

## 2025-03-26 ENCOUNTER — HOSPITAL ENCOUNTER (OUTPATIENT)
Facility: HOSPITAL | Age: 65
Discharge: HOME | End: 2025-03-29
Attending: INTERNAL MEDICINE
Payer: COMMERCIAL

## 2025-03-26 VITALS — HEIGHT: 69 IN | BODY MASS INDEX: 20.73 KG/M2 | WEIGHT: 140 LBS

## 2025-03-26 DIAGNOSIS — Z12.31 VISIT FOR SCREENING MAMMOGRAM: ICD-10-CM

## 2025-03-26 DIAGNOSIS — M81.0 AGE-RELATED OSTEOPOROSIS WITHOUT CURRENT PATHOLOGICAL FRACTURE: ICD-10-CM

## 2025-03-26 PROCEDURE — 77080 DXA BONE DENSITY AXIAL: CPT

## 2025-03-26 PROCEDURE — 77063 BREAST TOMOSYNTHESIS BI: CPT

## (undated) DEVICE — RD® QUICK LOAD® SURGICAL SUTURE, 2-0 MONOGLIDE®, ABSORBABLE, 53", PURPLE, MONOFILAMENT: Brand: RD® QUICK LOAD®

## (undated) DEVICE — PAD PT POS 36 IN SURGYPAD DISP

## (undated) DEVICE — FILTER SMK EVAC FLO CLR MEGADYNE

## (undated) DEVICE — STERILE POLYISOPRENE POWDER-FREE SURGICAL GLOVES WITH EMOLLIENT COATING: Brand: PROTEXIS

## (undated) DEVICE — TK® QUICK LOAD® UNIT: Brand: TK® QUICK LOAD®

## (undated) DEVICE — SHEAR HARMONIC ACET 5MMX36CM -- ACE PLUS

## (undated) DEVICE — INFECTION CONTROL KIT SYS

## (undated) DEVICE — TUBING, SUCTION, 1/4" X 12', STRAIGHT: Brand: MEDLINE

## (undated) DEVICE — SYR 50ML LR LCK 1ML GRAD NSAF --

## (undated) DEVICE — GARMENT,MEDLINE,DVT,INT,CALF,MED, GEN2: Brand: MEDLINE

## (undated) DEVICE — COVER LT HNDL PLAS RIG 1 PER PK

## (undated) DEVICE — TK® TI-KNOT® DEVICE: Brand: TK® TI-KNOT®

## (undated) DEVICE — SUTURE MCRYL SZ 4-0 L27IN ABSRB UD L19MM PS-2 1/2 CIR PRIM Y426H

## (undated) DEVICE — TOTAL TRAY, DB, 100% SILI FOLEY, 16FR 10: Brand: MEDLINE

## (undated) DEVICE — BLADE ASSEMB CLP HAIR FINE --

## (undated) DEVICE — REM POLYHESIVE ADULT PATIENT RETURN ELECTRODE: Brand: VALLEYLAB

## (undated) DEVICE — DRAPE,REIN 53X77,STERILE: Brand: MEDLINE

## (undated) DEVICE — Device

## (undated) DEVICE — PAD,SANITARY,11 IN,MAXI,N-STRL,IND WRAP: Brand: MEDLINE

## (undated) DEVICE — 4-PORT MANIFOLD: Brand: NEPTUNE 2

## (undated) DEVICE — TROCAR: Brand: KII® SLEEVE

## (undated) DEVICE — GLOVE SURG SZ 75 L1212IN FNGR THK138MIL BRN LTX FREE

## (undated) DEVICE — DERMABOND SKIN ADH 0.7ML -- DERMABOND ADVANCED 12/BX

## (undated) DEVICE — 5MM RD180® DEVICE: Brand: RD180® - THE RUNNING DEVICE®

## (undated) DEVICE — LAPAROSCOPIC TROCAR SLEEVE/SINGLE USE: Brand: KII® OPTICAL ACCESS SYSTEM

## (undated) DEVICE — SURGICAL PROCEDURE PACK GYN LAPAROSCOPY CUST SMH LF

## (undated) DEVICE — STRAP,POSITIONING,KNEE/BODY,FOAM,4X60": Brand: MEDLINE

## (undated) DEVICE — PREP SKN CHLRAPRP APL 26ML STR --

## (undated) DEVICE — TRAY PREP DRY W/ PREM GLV 2 APPL 6 SPNG 2 UNDPD 1 OVERWRAP

## (undated) DEVICE — SOLUTION IV 1000ML 0.9% SOD CHL